# Patient Record
Sex: FEMALE | Race: WHITE | HISPANIC OR LATINO | Employment: UNEMPLOYED | URBAN - METROPOLITAN AREA
[De-identification: names, ages, dates, MRNs, and addresses within clinical notes are randomized per-mention and may not be internally consistent; named-entity substitution may affect disease eponyms.]

---

## 2024-09-12 ENCOUNTER — OFFICE VISIT (OUTPATIENT)
Dept: OBSTETRICS AND GYNECOLOGY | Age: 44
End: 2024-09-12
Payer: MEDICAID

## 2024-09-12 VITALS
SYSTOLIC BLOOD PRESSURE: 128 MMHG | DIASTOLIC BLOOD PRESSURE: 80 MMHG | BODY MASS INDEX: 47.95 KG/M2 | WEIGHT: 254 LBS | HEIGHT: 61 IN

## 2024-09-12 DIAGNOSIS — N92.6 IRREGULAR MENSES: Primary | ICD-10-CM

## 2024-09-12 DIAGNOSIS — Z12.31 SCREENING MAMMOGRAM FOR BREAST CANCER: ICD-10-CM

## 2024-09-12 PROCEDURE — 99203 OFFICE O/P NEW LOW 30 MIN: CPT | Performed by: NURSE PRACTITIONER

## 2024-09-12 RX ORDER — LISINOPRIL AND HYDROCHLOROTHIAZIDE 20; 25 MG/1; MG/1
1 TABLET ORAL DAILY
COMMUNITY
Start: 2024-04-08 | End: 2025-04-08

## 2024-09-12 RX ORDER — NYSTATIN 100000 U/G
CREAM TOPICAL
COMMUNITY
Start: 2024-08-23

## 2024-09-12 RX ORDER — TRIAMCINOLONE ACETONIDE 1 MG/G
CREAM TOPICAL
COMMUNITY
Start: 2024-04-08

## 2024-09-12 RX ORDER — DROSPIRENONE 4 MG/1
1 TABLET, FILM COATED ORAL DAILY
Qty: 84 TABLET | Refills: 0 | Status: SHIPPED | OUTPATIENT
Start: 2024-09-12

## 2024-09-12 NOTE — PROGRESS NOTES
"Subjective   Sarah Chan is a 44 y.o. female is being seen today for   Chief Complaint   Patient presents with    Gynecologic Exam     New pt to establish care c/o irregular menses and desires to get on something to regulate them. Last pap 6/2024 (-) in [place].  Pt has not had MG done yet    .    History of Present Illness    New patient to our office  Previously lived in [place]  Had pap in June there and reports it was normal  Periods have always been irregular- has been on OCPs prior for cycle regulation but stopped taking it years ago  Oftentimes she will skip cycles and then on the months that she has periods they are heavy  Never has more than one in a month  Working on weight loss, she used to be close to 400pounds  She is losing weight with diet and exercise  Wants something to help regulate her cycles  Has PCP and he checked thyroid levels and they were normal  Has DM and hypertension that is controlled on medication      The following portions of the patient's history were reviewed and updated as appropriate: allergies, current medications, past family history, past medical history, past social history, past surgical history and problem list.    /80   Ht 154.9 cm (61\")   Wt 115 kg (254 lb)   LMP 09/10/2024 (Exact Date)   BMI 47.99 kg/m²         Review of Systems   Constitutional: Negative.    HENT: Negative.     Eyes: Negative.    Respiratory: Negative.     Cardiovascular: Negative.    Gastrointestinal: Negative.    Endocrine: Negative.    Genitourinary:  Positive for menstrual problem.   Musculoskeletal: Negative.    Skin: Negative.    Allergic/Immunologic: Negative.    Neurological: Negative.    Hematological: Negative.    Psychiatric/Behavioral: Negative.         Objective   Physical Exam  Constitutional:       Appearance: She is well-developed.   Cardiovascular:      Rate and Rhythm: Normal rate and regular rhythm.   Pulmonary:      Effort: Pulmonary effort is normal.   Abdominal:      " General: Bowel sounds are normal. There is no distension.      Palpations: Abdomen is soft.      Tenderness: There is no abdominal tenderness.   Skin:     General: Skin is warm and dry.   Neurological:      Mental Status: She is alert and oriented to person, place, and time.   Psychiatric:         Behavior: Behavior normal.           Assessment & Plan   Diagnoses and all orders for this visit:    1. Irregular menses (Primary)    2. Screening mammogram for breast cancer  -     Mammo Screening Digital Tomosynthesis Bilateral With CAD    Other orders  -     Slynd 4 MG tablet; Take 1 tablet by mouth Daily.  Dispense: 84 tablet; Refill: 0      Discussed cycles and treatment options.  Patient would like to start with pills  SLynd samples given and rx sent- plan to try POP given her history of hypertension.  If these don't regulate menses we can discuss other options at follow up  If she starts having abnormal bleeding like periods >7 days or multiple a months we would need to do a pelvic ultrasound as well  Patient has never had a mammogram    Plan follow up with me in 3 months, mammogram as well           Total time spent today with Sarah  was 30 minutes (level 3).  Greater than 50% of the time was spent coordinating care, answering her questions and counseling regarding pathophysiology of her presenting problem along with plans for any diagnositc work-up and treatment.

## 2024-10-07 ENCOUNTER — TELEPHONE (OUTPATIENT)
Dept: OBSTETRICS AND GYNECOLOGY | Age: 44
End: 2024-10-07

## 2024-10-07 NOTE — TELEPHONE ENCOUNTER
Caller: Sarah Chan    Relationship to patient: Self    Best call back number: [phone] (home)       Patient is needing: PT CALLED WANTING TO SPEAK WITH WALTER LECHUGA ABOUT HER IRREGULAR PERIODS. PT STATES SHE WOULD PREFER TO ASK PROVIDER DIRECTLY.

## 2024-10-07 NOTE — TELEPHONE ENCOUNTER
Discussed with patient, she is still having some break through bleeding.  She has not completed the first pack of pills yet.  Discussed that it can take up to 3 months to regulate.  If she continues to bleed after completion the first pack we discussed her returning to the office with ultrasound to rule out any underlying causes.

## 2024-11-07 ENCOUNTER — HOSPITAL ENCOUNTER (OUTPATIENT)
Facility: HOSPITAL | Age: 44
Setting detail: OBSERVATION
Discharge: HOME | End: 2024-11-07
Attending: EMERGENCY MEDICINE | Admitting: EMERGENCY MEDICINE
Payer: MEDICAID

## 2024-11-07 VITALS
DIASTOLIC BLOOD PRESSURE: 64 MMHG | WEIGHT: 253.53 LBS | OXYGEN SATURATION: 100 % | HEART RATE: 77 BPM | TEMPERATURE: 99.5 F | SYSTOLIC BLOOD PRESSURE: 115 MMHG | RESPIRATION RATE: 18 BRPM | BODY MASS INDEX: 47.87 KG/M2 | HEIGHT: 61 IN

## 2024-11-07 DIAGNOSIS — D62 ABLA (ACUTE BLOOD LOSS ANEMIA): Primary | ICD-10-CM

## 2024-11-07 DIAGNOSIS — E87.6 HYPOKALEMIA: ICD-10-CM

## 2024-11-07 DIAGNOSIS — N92.6 IRREGULAR MENSES: ICD-10-CM

## 2024-11-07 PROBLEM — D64.9 ANEMIA: Status: ACTIVE | Noted: 2024-11-07

## 2024-11-07 LAB
ABO GROUP BLD: NORMAL
ALBUMIN SERPL-MCNC: 3.8 G/DL (ref 3.5–5.2)
ALBUMIN/GLOB SERPL: 1.1 G/DL
ALP SERPL-CCNC: 45 U/L (ref 39–117)
ALT SERPL W P-5'-P-CCNC: 15 U/L (ref 1–33)
ANION GAP SERPL CALCULATED.3IONS-SCNC: 8.7 MMOL/L (ref 5–15)
APTT PPP: 23.9 SECONDS (ref 22.7–35.4)
AST SERPL-CCNC: 21 U/L (ref 1–32)
BASOPHILS # BLD MANUAL: 0 10*3/MM3 (ref 0–0.2)
BASOPHILS NFR BLD MANUAL: 0 % (ref 0–1.5)
BILIRUB SERPL-MCNC: 0.3 MG/DL (ref 0–1.2)
BLD GP AB SCN SERPL QL: NEGATIVE
BUN SERPL-MCNC: 8 MG/DL (ref 6–20)
BUN/CREAT SERPL: 11.1 (ref 7–25)
CALCIUM SPEC-SCNC: 8.6 MG/DL (ref 8.6–10.5)
CHLORIDE SERPL-SCNC: 103 MMOL/L (ref 98–107)
CO2 SERPL-SCNC: 25.3 MMOL/L (ref 22–29)
CREAT SERPL-MCNC: 0.72 MG/DL (ref 0.57–1)
DEPRECATED RDW RBC AUTO: 36.2 FL (ref 37–54)
EGFRCR SERPLBLD CKD-EPI 2021: 105.2 ML/MIN/1.73
EOSINOPHIL # BLD MANUAL: 0.06 10*3/MM3 (ref 0–0.4)
EOSINOPHIL NFR BLD MANUAL: 1 % (ref 0.3–6.2)
ERYTHROCYTE [DISTWIDTH] IN BLOOD BY AUTOMATED COUNT: 15.9 % (ref 12.3–15.4)
GLOBULIN UR ELPH-MCNC: 3.5 GM/DL
GLUCOSE BLDC GLUCOMTR-MCNC: 118 MG/DL (ref 70–130)
GLUCOSE SERPL-MCNC: 140 MG/DL (ref 65–99)
HCT VFR BLD AUTO: 25.4 % (ref 34–46.6)
HCT VFR BLD AUTO: 28.1 % (ref 34–46.6)
HGB BLD-MCNC: 7 G/DL (ref 12–15.9)
HGB BLD-MCNC: 7.9 G/DL (ref 12–15.9)
HYPOCHROMIA BLD QL: ABNORMAL
INR PPP: 0.98 (ref 0.9–1.1)
IRON 24H UR-MRATE: 12 MCG/DL (ref 37–145)
IRON SATN MFR SERPL: 2 % (ref 20–50)
LYMPHOCYTES # BLD MANUAL: 1.14 10*3/MM3 (ref 0.7–3.1)
LYMPHOCYTES NFR BLD MANUAL: 6.1 % (ref 5–12)
MAGNESIUM SERPL-MCNC: 2.3 MG/DL (ref 1.6–2.6)
MCH RBC QN AUTO: 17.5 PG (ref 26.6–33)
MCHC RBC AUTO-ENTMCNC: 27.6 G/DL (ref 31.5–35.7)
MCV RBC AUTO: 63.3 FL (ref 79–97)
MONOCYTES # BLD: 0.38 10*3/MM3 (ref 0.1–0.9)
NEUTROPHILS # BLD AUTO: 4.62 10*3/MM3 (ref 1.7–7)
NEUTROPHILS NFR BLD MANUAL: 74.5 % (ref 42.7–76)
NRBC BLD AUTO-RTO: 0.3 /100 WBC (ref 0–0.2)
NRBC SPEC MANUAL: 3.1 /100 WBC (ref 0–0.2)
OVALOCYTES BLD QL SMEAR: ABNORMAL
PLAT MORPH BLD: NORMAL
PLATELET # BLD AUTO: 485 10*3/MM3 (ref 140–450)
PMV BLD AUTO: 8.8 FL (ref 6–12)
POTASSIUM SERPL-SCNC: 3.2 MMOL/L (ref 3.5–5.2)
PROT SERPL-MCNC: 7.3 G/DL (ref 6–8.5)
PROTHROMBIN TIME: 13.2 SECONDS (ref 11.7–14.2)
RBC # BLD AUTO: 4.01 10*6/MM3 (ref 3.77–5.28)
RH BLD: POSITIVE
SODIUM SERPL-SCNC: 137 MMOL/L (ref 136–145)
T&S EXPIRATION DATE: NORMAL
TARGETS BLD QL SMEAR: ABNORMAL
TIBC SERPL-MCNC: 513 MCG/DL (ref 298–536)
TRANSFERRIN SERPL-MCNC: 344 MG/DL (ref 200–360)
TSH SERPL DL<=0.05 MIU/L-ACNC: 1.45 UIU/ML (ref 0.27–4.2)
VARIANT LYMPHS NFR BLD MANUAL: 18.4 % (ref 19.6–45.3)
WBC MORPH BLD: NORMAL
WBC NRBC COR # BLD AUTO: 6.2 10*3/MM3 (ref 3.4–10.8)

## 2024-11-07 PROCEDURE — 85018 HEMOGLOBIN: CPT | Performed by: EMERGENCY MEDICINE

## 2024-11-07 PROCEDURE — 85730 THROMBOPLASTIN TIME PARTIAL: CPT | Performed by: EMERGENCY MEDICINE

## 2024-11-07 PROCEDURE — G0378 HOSPITAL OBSERVATION PER HR: HCPCS

## 2024-11-07 PROCEDURE — 99291 CRITICAL CARE FIRST HOUR: CPT

## 2024-11-07 PROCEDURE — 86923 COMPATIBILITY TEST ELECTRIC: CPT

## 2024-11-07 PROCEDURE — 84466 ASSAY OF TRANSFERRIN: CPT | Performed by: NURSE PRACTITIONER

## 2024-11-07 PROCEDURE — 86900 BLOOD TYPING SEROLOGIC ABO: CPT

## 2024-11-07 PROCEDURE — P9016 RBC LEUKOCYTES REDUCED: HCPCS

## 2024-11-07 PROCEDURE — 85014 HEMATOCRIT: CPT | Performed by: EMERGENCY MEDICINE

## 2024-11-07 PROCEDURE — 85025 COMPLETE CBC W/AUTO DIFF WBC: CPT | Performed by: EMERGENCY MEDICINE

## 2024-11-07 PROCEDURE — 82948 REAGENT STRIP/BLOOD GLUCOSE: CPT

## 2024-11-07 PROCEDURE — 83540 ASSAY OF IRON: CPT | Performed by: NURSE PRACTITIONER

## 2024-11-07 PROCEDURE — 84443 ASSAY THYROID STIM HORMONE: CPT | Performed by: NURSE PRACTITIONER

## 2024-11-07 PROCEDURE — 36430 TRANSFUSION BLD/BLD COMPNT: CPT

## 2024-11-07 PROCEDURE — 80053 COMPREHEN METABOLIC PANEL: CPT | Performed by: EMERGENCY MEDICINE

## 2024-11-07 PROCEDURE — 86901 BLOOD TYPING SEROLOGIC RH(D): CPT | Performed by: EMERGENCY MEDICINE

## 2024-11-07 PROCEDURE — 85007 BL SMEAR W/DIFF WBC COUNT: CPT | Performed by: EMERGENCY MEDICINE

## 2024-11-07 PROCEDURE — 86850 RBC ANTIBODY SCREEN: CPT | Performed by: EMERGENCY MEDICINE

## 2024-11-07 PROCEDURE — 85610 PROTHROMBIN TIME: CPT | Performed by: EMERGENCY MEDICINE

## 2024-11-07 PROCEDURE — 83735 ASSAY OF MAGNESIUM: CPT | Performed by: NURSE PRACTITIONER

## 2024-11-07 PROCEDURE — 86901 BLOOD TYPING SEROLOGIC RH(D): CPT

## 2024-11-07 PROCEDURE — 86900 BLOOD TYPING SEROLOGIC ABO: CPT | Performed by: EMERGENCY MEDICINE

## 2024-11-07 RX ORDER — SODIUM CHLORIDE 0.9 % (FLUSH) 0.9 %
10 SYRINGE (ML) INJECTION EVERY 12 HOURS SCHEDULED
Status: DISCONTINUED | OUTPATIENT
Start: 2024-11-07 | End: 2024-11-08 | Stop reason: HOSPADM

## 2024-11-07 RX ORDER — BISACODYL 10 MG
10 SUPPOSITORY, RECTAL RECTAL DAILY PRN
Status: DISCONTINUED | OUTPATIENT
Start: 2024-11-07 | End: 2024-11-08 | Stop reason: HOSPADM

## 2024-11-07 RX ORDER — POLYETHYLENE GLYCOL 3350 17 G/17G
17 POWDER, FOR SOLUTION ORAL DAILY PRN
Status: DISCONTINUED | OUTPATIENT
Start: 2024-11-07 | End: 2024-11-08 | Stop reason: HOSPADM

## 2024-11-07 RX ORDER — AMOXICILLIN 250 MG
2 CAPSULE ORAL 2 TIMES DAILY PRN
Status: DISCONTINUED | OUTPATIENT
Start: 2024-11-07 | End: 2024-11-08 | Stop reason: HOSPADM

## 2024-11-07 RX ORDER — FERROUS SULFATE 325(65) MG
325 TABLET ORAL
Qty: 30 TABLET | Refills: 0 | Status: SHIPPED | OUTPATIENT
Start: 2024-11-07

## 2024-11-07 RX ORDER — POTASSIUM CHLORIDE 750 MG/1
40 TABLET, FILM COATED, EXTENDED RELEASE ORAL ONCE
Status: COMPLETED | OUTPATIENT
Start: 2024-11-07 | End: 2024-11-07

## 2024-11-07 RX ORDER — LISINOPRIL 20 MG/1
20 TABLET ORAL
Status: DISCONTINUED | OUTPATIENT
Start: 2024-11-08 | End: 2024-11-08 | Stop reason: HOSPADM

## 2024-11-07 RX ORDER — FERROUS SULFATE 325(65) MG
325 TABLET ORAL ONCE
Status: COMPLETED | OUTPATIENT
Start: 2024-11-07 | End: 2024-11-07

## 2024-11-07 RX ORDER — ACETAMINOPHEN 325 MG/1
650 TABLET ORAL EVERY 6 HOURS PRN
Status: DISCONTINUED | OUTPATIENT
Start: 2024-11-07 | End: 2024-11-08 | Stop reason: HOSPADM

## 2024-11-07 RX ORDER — SODIUM CHLORIDE 0.9 % (FLUSH) 0.9 %
10 SYRINGE (ML) INJECTION AS NEEDED
Status: DISCONTINUED | OUTPATIENT
Start: 2024-11-07 | End: 2024-11-08 | Stop reason: HOSPADM

## 2024-11-07 RX ORDER — NICOTINE POLACRILEX 4 MG
15 LOZENGE BUCCAL
Status: DISCONTINUED | OUTPATIENT
Start: 2024-11-07 | End: 2024-11-08 | Stop reason: HOSPADM

## 2024-11-07 RX ORDER — BISACODYL 5 MG/1
5 TABLET, DELAYED RELEASE ORAL DAILY PRN
Status: DISCONTINUED | OUTPATIENT
Start: 2024-11-07 | End: 2024-11-08 | Stop reason: HOSPADM

## 2024-11-07 RX ORDER — HYDROCHLOROTHIAZIDE 25 MG/1
25 TABLET ORAL
Status: DISCONTINUED | OUTPATIENT
Start: 2024-11-08 | End: 2024-11-08 | Stop reason: HOSPADM

## 2024-11-07 RX ORDER — DEXTROSE MONOHYDRATE 25 G/50ML
25 INJECTION, SOLUTION INTRAVENOUS
Status: DISCONTINUED | OUTPATIENT
Start: 2024-11-07 | End: 2024-11-08 | Stop reason: HOSPADM

## 2024-11-07 RX ORDER — SODIUM CHLORIDE 9 MG/ML
40 INJECTION, SOLUTION INTRAVENOUS AS NEEDED
Status: DISCONTINUED | OUTPATIENT
Start: 2024-11-07 | End: 2024-11-08 | Stop reason: HOSPADM

## 2024-11-07 RX ORDER — IBUPROFEN 600 MG/1
1 TABLET ORAL
Status: DISCONTINUED | OUTPATIENT
Start: 2024-11-07 | End: 2024-11-08 | Stop reason: HOSPADM

## 2024-11-07 RX ORDER — INSULIN LISPRO 100 [IU]/ML
2-7 INJECTION, SOLUTION INTRAVENOUS; SUBCUTANEOUS
Status: DISCONTINUED | OUTPATIENT
Start: 2024-11-07 | End: 2024-11-08 | Stop reason: HOSPADM

## 2024-11-07 RX ADMIN — Medication 10 ML: at 20:14

## 2024-11-07 RX ADMIN — FERROUS SULFATE TAB 325 MG (65 MG ELEMENTAL FE) 325 MG: 325 (65 FE) TAB at 21:15

## 2024-11-07 RX ADMIN — POTASSIUM CHLORIDE 40 MEQ: 750 TABLET, EXTENDED RELEASE ORAL at 14:08

## 2024-11-07 NOTE — PROGRESS NOTES
[facility] ATTESTATION NOTE    The MICHAEL and I have discussed this patient's history, physical exam, and treatment plan.  I have reviewed the documentation and personally had a face to face interaction with the patient. I affirm the documentation and agree with the treatment and plan.  The attached note describes my personal findings.      I provided a substantive portion of the care of this patient. I personally performed the physical exam, in its entirety.    Sarah Chan is a 44 y.o. female who presented to the emergency department today complaining of anemia.  She required admission to the observation unit for further evaluation.  She has been having heavy menses.  Plan transfusion.  She denies any chest pain or shortness of breath.  She denies orthostasis.  We will check TSH and iron panel.  We offered gynecology consult here but the patient declines.  She wants to follow-up as an outpatient.  She wants to receive transfusion and be discharged home.  She declines further workup here.      On exam:  GENERAL: Awake, alert, no acute distress  SKIN: Warm, dry  HENT: Normocephalic, atraumatic  EYES: no scleral icterus  CV: regular rhythm, regular rate  RESPIRATORY: normal effort, lungs clear  ABDOMEN: soft, nontender, nondistended  MUSCULOSKELETAL: no deformity  NEURO: alert, moves all extremities, follows commands          Note Disclaimer: At [facility], we believe that sharing information builds trust and better relationships. You are receiving this note because you recently visited [facility]. It is possible you will see health information before a provider has talked with you about it. This kind of information can be easy to misunderstand. To help you fully understand what it means for your health, we urge you to discuss this note with your provider.

## 2024-11-07 NOTE — ED PROVIDER NOTES
EMERGENCY DEPARTMENT ENCOUNTER    Room Number:  27/27  PCP: Guille Stringer NP    HPI:  Chief Complaint: Low hemoglobin  A complete HPI/ROS/PMH/PSH/SH/FH are unobtainable due to: None  Context: Sarah Chan is a 44 y.o. female who presents to the ED c/o acute low hemoglobin.  She states that she is been having recent gastroenteritis.  She saw her PCP yesterday and was found to have low hemoglobin was told to come to the ER for evaluation.  She reports having history of heavy menses.  She has been on birth control medicine that she started taking within the last approximately 6 weeks.        PAST MEDICAL HISTORY  Active Ambulatory Problems     Diagnosis Date Noted    Screening mammogram for breast cancer 09/12/2024    Irregular menses 09/12/2024     Resolved Ambulatory Problems     Diagnosis Date Noted    No Resolved Ambulatory Problems     Past Medical History:   Diagnosis Date    Diabetes     Hypertension          PAST SURGICAL HISTORY  Past Surgical History:   Procedure Laterality Date    TONSILLECTOMY           FAMILY HISTORY  Family History   Problem Relation Age of Onset    Stroke Father          SOCIAL HISTORY  Social History     Socioeconomic History    Marital status:    Tobacco Use    Smoking status: Never    Smokeless tobacco: Never   Vaping Use    Vaping status: Never Used   Substance and Sexual Activity    Alcohol use: Yes     Comment: social    Drug use: Never    Sexual activity: Yes     Partners: Male         ALLERGIES  Patient has no known allergies.        REVIEW OF SYSTEMS  Review of Systems     Included in HPI  All systems reviewed and negative except for those discussed in HPI.       PHYSICAL EXAM  ED Triage Vitals   Temp Heart Rate Resp BP SpO2   11/07/24 1239 11/07/24 1239 11/07/24 1309 11/07/24 1245 11/07/24 1239   98.8 °F (37.1 °C) (!) 136 16 131/85 98 %      Temp src Heart Rate Source Patient Position BP Location FiO2 (%)   -- -- -- -- --              Physical  Exam      GENERAL: no acute distress  HENT: nares patent  EYES: no scleral icterus  CV: regular rhythm, normal rate  RESPIRATORY: normal effort, clear to auscultation bilaterally  ABDOMEN: soft, nontender  MUSCULOSKELETAL: no deformity  NEURO: alert, moves all extremities, follows commands  PSYCH:  calm, cooperative  SKIN: warm, dry    Vital signs and nursing notes reviewed.          LAB RESULTS  Recent Results (from the past 24 hours)   Comprehensive Metabolic Panel    Collection Time: 11/07/24  1:08 PM    Specimen: Blood   Result Value Ref Range    Glucose 140 (H) 65 - 99 mg/dL    BUN 8 6 - 20 mg/dL    Creatinine 0.72 0.57 - 1.00 mg/dL    Sodium 137 136 - 145 mmol/L    Potassium 3.2 (L) 3.5 - 5.2 mmol/L    Chloride 103 98 - 107 mmol/L    CO2 25.3 22.0 - 29.0 mmol/L    Calcium 8.6 8.6 - 10.5 mg/dL    Total Protein 7.3 6.0 - 8.5 g/dL    Albumin 3.8 3.5 - 5.2 g/dL    ALT (SGPT) 15 1 - 33 U/L    AST (SGOT) 21 1 - 32 U/L    Alkaline Phosphatase 45 39 - 117 U/L    Total Bilirubin 0.3 0.0 - 1.2 mg/dL    Globulin 3.5 gm/dL    A/G Ratio 1.1 g/dL    BUN/Creatinine Ratio 11.1 7.0 - 25.0    Anion Gap 8.7 5.0 - 15.0 mmol/L    eGFR 105.2 >60.0 mL/min/1.73   Protime-INR    Collection Time: 11/07/24  1:08 PM    Specimen: Blood   Result Value Ref Range    Protime 13.2 11.7 - 14.2 Seconds    INR 0.98 0.90 - 1.10   aPTT    Collection Time: 11/07/24  1:08 PM    Specimen: Blood   Result Value Ref Range    PTT 23.9 22.7 - 35.4 seconds   CBC Auto Differential    Collection Time: 11/07/24  1:08 PM    Specimen: Blood   Result Value Ref Range    WBC 6.20 3.40 - 10.80 10*3/mm3    RBC 4.01 3.77 - 5.28 10*6/mm3    Hemoglobin 7.0 (L) 12.0 - 15.9 g/dL    Hematocrit 25.4 (L) 34.0 - 46.6 %    MCV 63.3 (L) 79.0 - 97.0 fL    MCH 17.5 (L) 26.6 - 33.0 pg    MCHC 27.6 (L) 31.5 - 35.7 g/dL    RDW 15.9 (H) 12.3 - 15.4 %    RDW-SD 36.2 (L) 37.0 - 54.0 fl    MPV 8.8 6.0 - 12.0 fL    Platelets 485 (H) 140 - 450 10*3/mm3    nRBC 0.3 (H) 0.0 - 0.2 /100 WBC        Ordered the above labs and reviewed the results.        RADIOLOGY  No Radiology Exams Resulted Within Past 24 Hours    Ordered the above noted radiological studies. Reviewed by me in PACS.        MEDICATIONS GIVEN IN ER  Medications   Potassium Replacement - Follow Nurse / BPA Driven Protocol (has no administration in time range)   potassium chloride (K-DUR,KLOR-CON) ER tablet 40 mEq (has no administration in time range)         ORDERS PLACED DURING THIS VISIT:  Orders Placed This Encounter   Procedures    Critical Care    Comprehensive Metabolic Panel    Protime-INR    aPTT    CBC Auto Differential    Manual Differential    Verify Informed Consent    Type & Screen    Prepare RBC, 1 Units    CBC & Differential         OUTPATIENT MEDICATION MANAGEMENT:  Current Facility-Administered Medications Ordered in Epic   Medication Dose Route Frequency Provider Last Rate Last Admin    potassium chloride (K-DUR,KLOR-CON) ER tablet 40 mEq  40 mEq Oral Once Albert Oneal II, MD        Potassium Replacement - Follow Nurse / BPA Driven Protocol   Does not apply PRN Albert Oneal II, MD         Current Outpatient Medications Ordered in Epic   Medication Sig Dispense Refill    metFORMIN (GLUCOPHAGE) 1000 MG tablet Take 1 tablet by mouth.      lisinopril-hydrochlorothiazide (PRINZIDE,ZESTORETIC) 20-25 MG per tablet Take 1 tablet by mouth Daily.      nystatin (MYCOSTATIN) 916007 UNIT/GM cream       sertraline (ZOLOFT) 50 MG tablet Take 1 tablet by mouth Daily.      Slynd 4 MG tablet Take 1 tablet by mouth Daily. 84 tablet 0    triamcinolone (KENALOG) 0.1 % cream Apply  topically to the appropriate area as directed.         PROCEDURES  Critical Care    Performed by: Albert Oneal II, MD  Authorized by: Albert Oneal II, MD    Critical care provider statement:     Critical care time (minutes):  30    Critical care was necessary to treat or prevent imminent or life-threatening deterioration of the  following conditions: acute severe anemia.    Critical care was time spent personally by me on the following activities:  Ordering and performing treatments and interventions, ordering and review of laboratory studies, pulse oximetry, re-evaluation of patient's condition, evaluation of patient's response to treatment, examination of patient and obtaining history from patient or surrogate            MEDICAL DECISION MAKING, PROGRESS, and CONSULTS    Discussion below represents my analysis of pertinent findings related to patient's condition, differential diagnosis, treatment plan and final disposition.        Differential diagnosis:    GI bleed, vaginal bleeding             Independent interpretation of labs, radiology studies, and discussions with consultants:  ED Course as of 11/07/24 1401   Thu Nov 07, 2024   1257 Medical chart review, reviewed outside records from [facility] SARAVANAN Laureano from yesterday.  Patient has been lost to follow-up as she splits her time between [place] and [place].  Patient with nausea and vomiting to be due to viral gastroenteritis.  Patient with depression.  Sertraline increased to 100 mg daily. [TD]   1357 Hemoglobin(!): 7.0 [TD]   1357 Potassium(!): 3.2 [TD]   1400 Cussed the case with RAMU Bravo.  She will admit to the observation unit. [TD]      ED Course User Index  [TD] Albert Oneal II, MD               DIAGNOSIS  Final diagnoses:   ABLA (acute blood loss anemia)   Hypokalemia         DISPOSITION  Admit      Latest Documented Vital Signs:  As of 14:01 EST  BP- 142/76 HR- 97 Temp- 98.8 °F (37.1 °C) O2 sat- 100%      --    Please note that portions of this were completed with a voice recognition program.       Note Disclaimer: At [facility], we believe that sharing information builds trust and better relationships. You are receiving this note because you are receiving care at [facility] or recently visited. It is possible you will see [facility]  information before a provider has talked with you about it. This kind of information can be easy to misunderstand. To help you fully understand what it means for your health, we urge you to discuss this note with your provider.         Albert Oneal II, MD  11/07/24 8851

## 2024-11-07 NOTE — H&P
[facility]   HISTORY AND PHYSICAL    Patient Name: Sarah Chan  : 1980  MRN: [id number]  Primary Care Physician:  Guille Stringer NP  Date of admission: 2024    Subjective   Subjective     Chief Complaint:   Chief Complaint   Patient presents with    Abnormal Lab     HPI:    Sarah Chan is a 44 y.o. female presented to the emergency department due to abnormal lab value.  Medical history significant for but not limited to bipolar 1 disorder, diabetes, hypertension, irregular periods.  She was recently started on oral birth control due to irregular and heavy menstrual cycles.  She reports cycles are still irregular, but lighter.  LMP started 2 days ago.  She was told to come to the emergency department for possible blood transfusion or iron infusion due to anemia.  2 days ago she had significant nausea, vomiting and diarrhea.  Symptoms have since resolved.  She denies abdominal pain.  No chest pain or dyspnea.  Endorses mild fatigue.    CMP significant for low potassium 3.2, glucose elevated at 140.  Globin was 7 with a hematocrit of 25.4.  Platelets elevated at 48.5.  MCV, MCH, MCHC all low.    She will be admitted to the ED observation unit.  Will transfuse 1 unit of packed red blood cells.     Review of Systems   All systems were reviewed and negative except for: Those mentioned in HPI    Personal History     Past Medical History:   Diagnosis Date    Bipolar 1 disorder     Diabetes     Hypertension     Irregular menses        Past Surgical History:   Procedure Laterality Date    TONSILLECTOMY         Family History: family history includes Stroke in her father. Otherwise pertinent FHx was reviewed and not pertinent to current issue.    Social History:  reports that she has never smoked. She has never used smokeless tobacco. She reports current alcohol use. She reports that she does not use drugs.    Home Medications:  Drospirenone, lisinopril-hydrochlorothiazide, metFORMIN,  nystatin, sertraline, and triamcinolone    Allergies:  No Known Allergies    Objective   Objective     Vitals:   Temp:  [98.6 °F (37 °C)-98.8 °F (37.1 °C)] 98.6 °F (37 °C)  Heart Rate:  [] 82  Resp:  [16] 16  BP: (122-142)/(65-85) 122/65  Physical Exam    Constitutional: Awake, alert   Eyes: PERRLA, sclerae anicteric, no conjunctival injection   HENT: NCAT, mucous membranes moist   Neck: Supple, no thyromegaly, no lymphadenopathy, trachea midline   Respiratory: Clear to auscultation bilaterally, nonlabored respirations    Cardiovascular: RRR, no murmurs, rubs, or gallops, palpable pedal pulses bilaterally   Gastrointestinal: Positive bowel sounds, soft, nontender, nondistended   Musculoskeletal: No bilateral ankle edema, no clubbing or cyanosis to extremities   Psychiatric: Appropriate affect, cooperative   Neurologic: Oriented x 3, strength symmetric in all extremities, Cranial Nerves grossly intact to confrontation, speech clear   Skin: No rashes     Result Review    Result Review:  I have personally reviewed the results from the time of this admission to 11/7/2024 16:00 EST and agree with these findings:  [x]  Laboratory list / accordion  []  Microbiology  []  Radiology  []  EKG/Telemetry   []  Cardiology/Vascular   []  Pathology  []  Old records  []  Other:  Most notable findings include: Potassium low 3.2, glucose elevated 140, hemoglobin and hematocrit low 7.0, 25.4      Assessment & Plan   Assessment / Plan     Brief Patient Summary:  Sarah Chan is a 44 y.o. female who be made to the ED observation unit due to microcytic anemia.  Will transfuse 1 unit packed red blood cells.    Active Hospital Problems:  Active Hospital Problems    Diagnosis     **Anemia      Plan:     Anemia  -Transfuse 1 unit packed red blood cells  -Check iron panel  -Check TSH  -Will need follow-up with her OB/GYN outpatient    Diabetes  -Hold metformin  -Correction scale ordered    Bipolar  -Continue sertraline    VTE  Prophylaxis:  Mechanical VTE prophylaxis orders are present.    CODE STATUS:       Admission Status:  I believe this patient meets observation status.    Electronically signed by SARAVANAN Nielsen, 11/07/24, 4:00 PM EST.    75 minutes has been spent by [facility] providers in the care of this patient while under observation status    I have worn appropriate PPE during this patient encounter, sanitized my hands both with entering and exiting patient's room.

## 2024-11-07 NOTE — ED NOTES
Patient arrives via pv from home for complaints of abnormal blood work. Patient had blood drawn and was told her Hemoglobin was low. Patient has not had any blood in her stool, currently on her period. Alert and oriented x4.

## 2024-11-07 NOTE — ED NOTES
Nursing report ED to floor  Sarah Chan  44 y.o.  female    HPI :  HPI  Stated Reason for Visit: abnormal labs  History Obtained From: patient    Chief Complaint  Chief Complaint   Patient presents with    Abnormal Lab       Admitting doctor:   Luther Lam MD    Admitting diagnosis:   The primary encounter diagnosis was ABLA (acute blood loss anemia). A diagnosis of Hypokalemia was also pertinent to this visit.    Code status:   Current Code Status       Date Active Code Status Order ID Comments User Context       Not on file            Allergies:   Patient has no known allergies.    Isolation:   No active isolations    Intake and Output  No intake or output data in the 24 hours ending 11/07/24 1408    Weight:       11/07/24  1241   Weight: 115 kg (253 lb 8.5 oz)       Most recent vitals:   Vitals:    11/07/24 1247 11/07/24 1303 11/07/24 1309 11/07/24 1316   BP: 137/79   142/76   Pulse:  89  97   Resp:   16    Temp:       SpO2:  100%  100%   Weight:       Height:           Active LDAs/IV Access:   Lines, Drains & Airways       Active LDAs       Name Placement date Placement time Site Days    Peripheral IV 11/07/24 1310 Right Antecubital 11/07/24  1310  Antecubital  less than 1                    Labs (abnormal labs have a star):   Labs Reviewed   COMPREHENSIVE METABOLIC PANEL - Abnormal; Notable for the following components:       Result Value    Glucose 140 (*)     Potassium 3.2 (*)     All other components within normal limits    Narrative:     GFR Normal >60  Chronic Kidney Disease <60  Kidney Failure <15     CBC WITH AUTO DIFFERENTIAL - Abnormal; Notable for the following components:    Hemoglobin 7.0 (*)     Hematocrit 25.4 (*)     MCV 63.3 (*)     MCH 17.5 (*)     MCHC 27.6 (*)     RDW 15.9 (*)     RDW-SD 36.2 (*)     Platelets 485 (*)     nRBC 0.3 (*)     All other components within normal limits   MANUAL DIFFERENTIAL - Abnormal; Notable for the following components:    Lymphocyte % 18.4 (*)      nRBC 3.1 (*)     All other components within normal limits   PROTIME-INR - Normal   APTT - Normal   POCT GLUCOSE FINGERSTICK   POCT GLUCOSE FINGERSTICK   POCT GLUCOSE FINGERSTICK   POCT GLUCOSE FINGERSTICK   TYPE AND SCREEN   PREPARE RBC   CBC AND DIFFERENTIAL    Narrative:     The following orders were created for panel order CBC & Differential.  Procedure                               Abnormality         Status                     ---------                               -----------         ------                     CBC Auto Differential[[id number]]        Abnormal            Final result                 Please view results for these tests on the individual orders.       EKG:   No orders to display       Meds given in ED:   Medications   Potassium Replacement - Follow Nurse / BPA Driven Protocol (has no administration in time range)   potassium chloride (K-DUR,KLOR-CON) ER tablet 40 mEq (has no administration in time range)   sodium chloride 0.9 % flush 10 mL (has no administration in time range)   sodium chloride 0.9 % flush 10 mL (has no administration in time range)   sodium chloride 0.9 % infusion 40 mL (has no administration in time range)   Potassium Replacement - Follow Nurse / BPA Driven Protocol (has no administration in time range)   Magnesium Standard Dose Replacement - Follow Nurse / BPA Driven Protocol (has no administration in time range)   Phosphorus Replacement - Follow Nurse / BPA Driven Protocol (has no administration in time range)   Calcium Replacement - Follow Nurse / BPA Driven Protocol (has no administration in time range)   sennosides-docusate (PERICOLACE) 8.6-50 MG per tablet 2 tablet (has no administration in time range)     And   polyethylene glycol (MIRALAX) packet 17 g (has no administration in time range)     And   bisacodyl (DULCOLAX) EC tablet 5 mg (has no administration in time range)     And   bisacodyl (DULCOLAX) suppository 10 mg (has no administration in time range)   dextrose  (GLUTOSE) oral gel 15 g (has no administration in time range)   dextrose (D50W) (25 g/50 mL) IV injection 25 g (has no administration in time range)   glucagon (GLUCAGEN) injection 1 mg (has no administration in time range)   insulin lispro (HUMALOG/ADMELOG) injection 2-7 Units (has no administration in time range)       Imaging results:  No radiology results for the last day    Ambulatory status:   -  up ad kellee    Social issues:   Social History     Socioeconomic History    Marital status:    Tobacco Use    Smoking status: Never    Smokeless tobacco: Never   Vaping Use    Vaping status: Never Used   Substance and Sexual Activity    Alcohol use: Yes     Comment: social    Drug use: Never    Sexual activity: Yes     Partners: Male       Peripheral Neurovascular  Peripheral Neurovascular (Adult)  Peripheral Neurovascular WDL: WDL    Neuro Cognitive  Neuro Cognitive (Adult)  Cognitive/Neuro/Behavioral WDL: .WDL except, mood/behavior  Mood/Behavior: anxious    Learning  Learning Assessment  Learning Readiness and Ability: no barriers identified    Respiratory  Respiratory  Airway WDL: WDL  Respiratory WDL  Respiratory WDL: WDL    Abdominal Pain       Pain Assessments  Pain (Adult)  (0-10) Pain Rating: Rest: 0    NIH Stroke Scale       Marybeth Colin, RN  11/07/24 14:08 EST

## 2024-11-08 LAB
BH BB BLOOD EXPIRATION DATE: NORMAL
BH BB BLOOD TYPE BARCODE: 6200
BH BB DISPENSE STATUS: NORMAL
BH BB PRODUCT CODE: NORMAL
BH BB UNIT NUMBER: NORMAL
CROSSMATCH INTERPRETATION: NORMAL
UNIT  ABO: NORMAL
UNIT  RH: NORMAL

## 2024-11-08 NOTE — DISCHARGE SUMMARY
ED OBSERVATION PROGRESS/DISCHARGE SUMMARY    Date of Admission: 11/7/2024   LOS: 0 days   PCP: Guille Stringer NP    Final Diagnosis anemia      Subjective     Hospital Outcome:     Sarah Chan is a 44 y.o. female presented to the emergency department due to abnormal lab value.  Medical history significant for but not limited to bipolar 1 disorder, diabetes, hypertension, irregular periods.  She was recently started on oral birth control due to irregular and heavy menstrual cycles.  She reports cycles are still irregular, but lighter.  LMP started 2 days ago.  She was told to come to the emergency department for possible blood transfusion or iron infusion due to anemia.  2 days ago she had significant nausea, vomiting and diarrhea.  Symptoms have since resolved.  She denies abdominal pain.  No chest pain or dyspnea.  Endorses mild fatigue.     CMP significant for low potassium 3.2, glucose elevated at 140.  Globin was 7 with a hematocrit of 25.4.  Platelets elevated at 48.5.  MCV, MCH, MCHC all low.     She will be admitted to the ED observation unit.  Will transfuse 1 unit of packed red blood cells.     ROS:  General: no fevers, chills  Respiratory: no cough, dyspnea  Cardiovascular: no chest pain, palpitations  Abdomen: No abdominal pain, nausea, vomiting, or diarrhea  Neurologic: No focal weakness    Objective   Physical Exam:  I have reviewed the vital signs.  Temp:  [98.6 °F (37 °C)-99.5 °F (37.5 °C)] 99.5 °F (37.5 °C)  Heart Rate:  [] 77  Resp:  [16-18] 18  BP: (109-142)/(64-85) 115/64  General Appearance:    Alert, cooperative, no distress  Head:    Normocephalic, atraumatic  Eyes:    Sclerae anicteric  Neck:   Supple, no mass  Lungs: Clear to auscultation bilaterally, respirations unlabored  Heart: Regular rate and rhythm, S1 and S2 normal, no murmur, rub or gallop  Abdomen:  Soft, nontender, bowel sounds active, nondistended  Extremities: No clubbing, cyanosis, or edema to lower  extremities  Pulses:  2+ and symmetric in distal lower extremities  Skin: No rashes   Neurologic: Oriented x3, Normal strength to extremities    Results Review:    I have reviewed the labs, radiology results and diagnostic studies.    Results from last 7 days   Lab Units 11/07/24 2008 11/07/24  1308   WBC 10*3/mm3  --  6.20   HEMOGLOBIN g/dL 7.9* 7.0*   HEMATOCRIT % 28.1* 25.4*   PLATELETS 10*3/mm3  --  485*     Results from last 7 days   Lab Units 11/07/24  1308   SODIUM mmol/L 137   POTASSIUM mmol/L 3.2*   CHLORIDE mmol/L 103   CO2 mmol/L 25.3   BUN mg/dL 8   CREATININE mg/dL 0.72   CALCIUM mg/dL 8.6   BILIRUBIN mg/dL 0.3   ALK PHOS U/L 45   ALT (SGPT) U/L 15   AST (SGOT) U/L 21   GLUCOSE mg/dL 140*     Imaging Results (Last 24 Hours)       ** No results found for the last 24 hours. **            I have reviewed the medications.     Discharge Medications        New Medications        Instructions Start Date   ferrous sulfate 325 (65 FE) MG tablet   325 mg, Oral, Daily With Breakfast             Changes to Medications        Instructions Start Date   sertraline 50 MG tablet  Commonly known as: ZOLOFT  What changed: when to take this   50 mg, Oral, Daily             Continue These Medications        Instructions Start Date   lisinopril-hydrochlorothiazide 20-25 MG per tablet  Commonly known as: PRINZIDE,ZESTORETIC   1 tablet, Daily      metFORMIN 1000 MG tablet  Commonly known as: GLUCOPHAGE   1,000 mg      nystatin 196511 UNIT/GM cream  Commonly known as: MYCOSTATIN       Slynd 4 MG tablet  Generic drug: Drospirenone   4 mg, Oral, Daily      triamcinolone 0.1 % cream  Commonly known as: KENALOG   Topical              ---------------------------------------------------------------------------------------------  Assessment & Plan   Assessment/Problem List    Anemia      Plan:      Anemia, microcytic, hypochromic  Menorrhagia hx  Iron deficiency  -Transfuse 1 unit packed red blood cells, initial hemoglobin 7.0,  repeat of 7.9 after 1 unit PRBCs given  -Check iron panel low iron and iron saturation.  Will order daily iron 325 mg with meals upon discharge.  -TSH normal  -Will need follow-up with her OB/GYN outpatient, as patient was offered OB/GYN consultation for the morning however patient declined this and would like to go home instead.  Patient is asymptomatic at this time upon discharge and strict return precautions were discussed and patient voiced understanding.  Patient to follow-up with gynecology and PCP for repeat lab work and follow-up on anemia.    Hypokalemia, replaced  -Potassium 3.2, 40 mEq of potassium given     Diabetes  -Hold metformin  -Correction scale ordered     Bipolar  -Continue sertraline     VTE Prophylaxis:  Mechanical VTE prophylaxis orders are present.        Disposition: Home    Follow-up after Discharge: Gynecology and PCP in 1 week's time    This note will serve as a discharge summary    RAMU Peñaloza 11/07/24 21:13 EST    I have worn appropriate PPE during this patient encounter, sanitized my hands both with entering and exiting patient's room.      38 minutes has been spent by [facility] providers in the care of this patient while under observation status

## 2024-11-08 NOTE — PLAN OF CARE
Goal Outcome Evaluation:  Plan of Care Reviewed With: patient           Outcome Evaluation: Pts being discharged. IV removed. After Visit Summary form reviewed with patient.

## 2024-11-08 NOTE — PLAN OF CARE
Goal Outcome Evaluation:   Pts being prepared for discharge. IV removed. After visiy summary presented

## 2024-11-08 NOTE — DISCHARGE INSTRUCTIONS
Please return to the ER if you have significant shortness of breath, feel like you are going to pass out or do pass out as you will need reevaluation.  Please follow-up with her primary care provider and OB/GYN provider in 1 week's time.  Please take daily iron supplement with meals to help with your anemia.

## 2024-12-12 ENCOUNTER — OFFICE VISIT (OUTPATIENT)
Dept: OBSTETRICS AND GYNECOLOGY | Age: 44
End: 2024-12-12
Payer: MEDICAID

## 2024-12-12 VITALS
DIASTOLIC BLOOD PRESSURE: 78 MMHG | WEIGHT: 257.4 LBS | HEIGHT: 61 IN | SYSTOLIC BLOOD PRESSURE: 128 MMHG | BODY MASS INDEX: 48.6 KG/M2

## 2024-12-12 DIAGNOSIS — N93.9 ABNORMAL UTERINE BLEEDING (AUB): Primary | ICD-10-CM

## 2024-12-12 DIAGNOSIS — N92.6 IRREGULAR MENSES: ICD-10-CM

## 2024-12-12 RX ORDER — FOLIC ACID 1 MG/1
1 TABLET ORAL DAILY
COMMUNITY
Start: 2024-11-12 | End: 2025-11-12

## 2024-12-12 RX ORDER — NORETHINDRONE ACETATE AND ETHINYL ESTRADIOL, ETHINYL ESTRADIOL AND FERROUS FUMARATE 1MG-10(24)
1 KIT ORAL DAILY
Qty: 28 TABLET | Refills: 3 | COMMUNITY

## 2024-12-12 RX ORDER — FAMOTIDINE 20 MG/1
20 TABLET, FILM COATED ORAL
COMMUNITY
Start: 2024-11-11 | End: 2025-11-11

## 2024-12-12 RX ORDER — ONDANSETRON 4 MG/1
4 TABLET, ORALLY DISINTEGRATING ORAL
COMMUNITY
Start: 2024-11-05

## 2024-12-12 NOTE — PROGRESS NOTES
"Subjective   Sarah Chan is a 44 y.o. female is being seen today for   Chief Complaint   Patient presents with    Follow-up     Gyn F/u  Cc:3 month f/up and pt miss mg appt reschedule    .    History of Present Illness    Patient here for OCP follow up  Started on Slynd for irregular menses- decided to try POPs first due to patient preference and history of hypertension although it is well controlled on PB meds  She is still having irregular bleeding on Slynd   Since seeing me last she had a very heavy menses that took her to the ER where she ended up needing a transfusion  Since that time her bleeding has greatly decreased but she is still having some breakthrough bleeding  She is on her third pack of pills  She does have follow-up with hematology in January, she is not symptomatic of any anemia at this point and is taking iron supplements  Patient is not having active bleeding right now  She is good about taking the pill and has not missed any    The following portions of the patient's history were reviewed and updated as appropriate: allergies, current medications, past family history, past medical history, past social history, past surgical history and problem list.    /78   Ht 154.9 cm (60.98\")   Wt 117 kg (257 lb 6.4 oz)   LMP 12/10/2024 (Approximate)   BMI 48.66 kg/m²         Review of Systems   Constitutional: Negative.    HENT: Negative.     Eyes: Negative.    Respiratory: Negative.     Cardiovascular: Negative.    Gastrointestinal: Negative.    Endocrine: Negative.    Genitourinary:  Positive for menstrual problem.   Musculoskeletal: Negative.    Skin: Negative.    Allergic/Immunologic: Negative.    Neurological: Negative.    Hematological: Negative.    Psychiatric/Behavioral: Negative.         Objective   Physical Exam  Constitutional:       Appearance: She is well-developed.   Cardiovascular:      Rate and Rhythm: Normal rate and regular rhythm.   Pulmonary:      Effort: Pulmonary effort " is normal.   Abdominal:      General: Bowel sounds are normal. There is no distension.      Palpations: Abdomen is soft.      Tenderness: There is no abdominal tenderness.   Skin:     General: Skin is warm and dry.   Neurological:      Mental Status: She is alert and oriented to person, place, and time.   Psychiatric:         Behavior: Behavior normal.           Assessment & Plan   Diagnoses and all orders for this visit:    1. Abnormal uterine bleeding (AUB) (Primary)    2. Irregular menses      Discussed periods with patient, given her periods have not regulated out I do think a pelvic ultrasound and endometrial biopsy is warranted, we will get that scheduled in the next couple weeks  In the meantime we will change her pill to an estrogen containing pill and see if that better controls her bleeding  She has a few follow-ups coming up and will have her blood pressure checked  We also discussed the option of an IUD, she would like to try the pill first but may be interested in that if it still is not regulated  Bleeding precautions advised           Total time spent today with Sarah  was 30 minutes (level 4).  Greater than 50% of the time was spent coordinating care, answering her questions and counseling regarding pathophysiology of her presenting problem along with plans for any diagnositc work-up and treatment.

## 2024-12-30 ENCOUNTER — TELEPHONE (OUTPATIENT)
Dept: OBSTETRICS AND GYNECOLOGY | Age: 44
End: 2024-12-30

## 2024-12-30 NOTE — TELEPHONE ENCOUNTER
Caller Name: Sarah Chan      Relationship: Self      Best Contact Number: [phone] (home)       Patient is requesting samples of LO LOESTRINSE      How many days of medication do you have left? 0 NONE      Additional Information: WAS GIVEN SAMPLE OF LO LOESTRINSE  WOULD LIKE ANOTHER SAMPLE OR PRESCRIPTION

## 2025-01-02 RX ORDER — NORETHINDRONE ACETATE AND ETHINYL ESTRADIOL, ETHINYL ESTRADIOL AND FERROUS FUMARATE 1MG-10(24)
1 KIT ORAL DAILY
Qty: 28 TABLET | Refills: 3 | Status: SHIPPED | OUTPATIENT
Start: 2025-01-02

## 2025-01-02 RX ORDER — NORETHINDRONE ACETATE AND ETHINYL ESTRADIOL, ETHINYL ESTRADIOL AND FERROUS FUMARATE 1MG-10(24)
1 KIT ORAL DAILY
Qty: 28 TABLET | Refills: 3 | Status: CANCELLED | OUTPATIENT
Start: 2025-01-02

## 2025-01-07 ENCOUNTER — TELEPHONE (OUTPATIENT)
Age: 45
End: 2025-01-07

## 2025-01-07 NOTE — TELEPHONE ENCOUNTER
Patient returned the call and rescheduled her visit with Hnia.   responds to verbal commands/cranial nerves intact/normal strength/sensation intact/alert and oriented x 3

## 2025-01-08 ENCOUNTER — TELEPHONE (OUTPATIENT)
Dept: OBSTETRICS AND GYNECOLOGY | Age: 45
End: 2025-01-08

## 2025-01-08 NOTE — TELEPHONE ENCOUNTER
Hub staff attempted to follow warm transfer process and was unsuccessful     Caller: Sarah Chan    Relationship to patient: Self    Best call back number: [phone]      Patient is needing: PT NEEDS TO RESCHEDULE HER GYN FOLLOW UP AND U/S APPTS FROM TODAY DUE TO THE WEATHER.

## 2025-01-14 ENCOUNTER — TELEPHONE (OUTPATIENT)
Dept: OBSTETRICS AND GYNECOLOGY | Age: 45
End: 2025-01-14
Payer: MEDICAID

## 2025-01-23 ENCOUNTER — OFFICE VISIT (OUTPATIENT)
Dept: OBSTETRICS AND GYNECOLOGY | Age: 45
End: 2025-01-23
Payer: MEDICAID

## 2025-01-23 VITALS
HEIGHT: 61 IN | SYSTOLIC BLOOD PRESSURE: 136 MMHG | WEIGHT: 266 LBS | DIASTOLIC BLOOD PRESSURE: 84 MMHG | BODY MASS INDEX: 50.22 KG/M2

## 2025-01-23 DIAGNOSIS — Z13.9 SPECIAL SCREENING: Primary | ICD-10-CM

## 2025-01-23 DIAGNOSIS — N93.9 ABNORMAL UTERINE BLEEDING (AUB): ICD-10-CM

## 2025-01-23 LAB
B-HCG UR QL: NEGATIVE
EXPIRATION DATE: NORMAL
INTERNAL NEGATIVE CONTROL: NORMAL
INTERNAL POSITIVE CONTROL: NORMAL
Lab: NORMAL

## 2025-01-23 RX ORDER — NORETHINDRONE ACETATE AND ETHINYL ESTRADIOL, ETHINYL ESTRADIOL AND FERROUS FUMARATE 1MG-10(24)
1 KIT ORAL DAILY
Qty: 28 TABLET | Refills: 3 | Status: SHIPPED | OUTPATIENT
Start: 2025-01-23

## 2025-01-23 NOTE — PROGRESS NOTES
"Marivel Chan is a 44 y.o. female is being seen today for   Chief Complaint   Patient presents with    Gynecologic Exam     Abnormal bleeding, EMBX   .    History of Present Illness    Patient here for follow-up for abnormal uterine bleeding, ultrasound, endometrial biopsy  She is taking  LoLoestrin and states her bleeding has decreased quite a bit but she is still continuing to bleed most days  It is lots better than it was on the progesterone only pills  BPs have been stable  Recent labs did show that her hemoglobin is stable  States most days she is just having to wear a pad  She is very, very nervous today      The following portions of the patient's history were reviewed and updated as appropriate: allergies, current medications, past family history, past medical history, past social history, past surgical history and problem list.    /84   Ht 154.9 cm (60.98\")   Wt 121 kg (266 lb)   LMP 01/20/2025   BMI 50.29 kg/m²       Review of Systems   Constitutional: Negative.    HENT: Negative.     Eyes: Negative.    Respiratory: Negative.     Cardiovascular: Negative.    Gastrointestinal: Negative.    Endocrine: Negative.    Genitourinary:  Positive for vaginal bleeding.   Musculoskeletal: Negative.    Skin: Negative.    Allergic/Immunologic: Negative.    Neurological: Negative.    Hematological: Negative.    Psychiatric/Behavioral: Negative.         Objective   Physical Exam  Endometrial Biopsy Procedure:     Informed consent was obtained and risks described as bleeding, cramping, uterine perforation,and infection. She desired to proceed. A speculum was inserted into the vagina and the cervix visualized and cleansed with betadine. An Allis clamp was used to grasp the anterior cervix. The pipelle was inserted and the plunger pulled back. The pipelle was rotated and withdrawn from the uterus with return of moderate amount of endometrial tissue. A total of 3 passes were made with the pipelle. " The Allis clamp was then removed and hemostasis ensured. The patient tolerated the procedure well. Tissue was sent for pathologic examination.    Assessment & Plan   Diagnoses and all orders for this visit:    1. Special screening (Primary)  -     POC Pregnancy, Urine    2. Abnormal uterine bleeding (AUB)  -     Reference Histopathology    Other orders  -     Norethin-Eth Estrad-Fe Biphas (Lo Loestrin Fe) 1 MG-10 MCG / 10 MCG tablet; Take 1 tablet by mouth Daily.  Dispense: 28 tablet; Refill: 3      Discussed ultrasound with patient, she does have an area within her endometrium that could be a polyp, this could be an explanation for her bleeding  Endometrial biopsy is sent to pathology  She can continue Loloestrin  Plan follow-up with Dr. Mcclain in 2 weeks to review pathology and discuss treatment options  Bleeding precautions advised           Total time spent today with Sarah  was 30 minutes (level 4).  Greater than 50% of the time was spent coordinating care, answering her questions and counseling regarding pathophysiology of her presenting problem along with plans for any diagnositc work-up and treatment.

## 2025-01-28 LAB
DX ICD CODE: NORMAL
PATH REPORT.FINAL DX SPEC: NORMAL
PATH REPORT.GROSS SPEC: NORMAL
PATH REPORT.SITE OF ORIGIN SPEC: NORMAL
PATHOLOGIST NAME: NORMAL
PAYMENT PROCEDURE: NORMAL

## 2025-02-06 ENCOUNTER — OFFICE VISIT (OUTPATIENT)
Dept: OBSTETRICS AND GYNECOLOGY | Age: 45
End: 2025-02-06
Payer: MEDICAID

## 2025-02-06 VITALS
DIASTOLIC BLOOD PRESSURE: 94 MMHG | BODY MASS INDEX: 49.69 KG/M2 | HEIGHT: 61 IN | WEIGHT: 263.2 LBS | SYSTOLIC BLOOD PRESSURE: 130 MMHG

## 2025-02-06 DIAGNOSIS — N93.9 ABNORMAL UTERINE BLEEDING (AUB): Primary | ICD-10-CM

## 2025-02-06 NOTE — PROGRESS NOTES
[facility]   Obstetrics and Gynecology     2025      Patient:  Sarah Chan   MR#:[id number]    Office note    Chief Complaint   Patient presents with    Follow-up     CC: gyn f/u AUB. Pt c/o chin hair growth.        Subjective     History of Present Illness  44 y.o. female  presenting to discuss abnormal uterine bleeding after undergoing EMB that was insufficient.  Ultrasound shows echogenic focus, possible polyp.      Relevant data reviewed: US Non-ob Transvaginal (2025 13:20)       Patient Active Problem List   Diagnosis    Screening mammogram for breast cancer    Irregular menses    Anemia    Abnormal uterine bleeding (AUB)       Past Medical History:   Diagnosis Date    Bipolar 1 disorder     Diabetes     Encounter for blood transfusion     Hypertension     Irregular menses      Past Surgical History:   Procedure Laterality Date    TONSILLECTOMY       Obstetric History:  OB History          0    Para   0    Term   0       0    AB   0    Living   0         SAB   0    IAB   0    Ectopic   0    Molar   0    Multiple   0    Live Births   0               Menstrual History:     Patient's last menstrual period was 2025 (exact date).       The patient has never been pregnant.  Family History   Problem Relation Age of Onset    Stroke Father      Social History     Tobacco Use    Smoking status: Never    Smokeless tobacco: Never   Vaping Use    Vaping status: Never Used   Substance Use Topics    Alcohol use: Yes     Comment: social    Drug use: Never     Patient has no known allergies.    Current Outpatient Medications:     cyanocobalamin (VITAMIN B-12) 500 MCG tablet, Take 2 tablets by mouth Daily., Disp: , Rfl:     ferrous sulfate 325 (65 FE) MG tablet, Take 1 tablet by mouth Daily With Breakfast., Disp: 30 tablet, Rfl: 0    folic acid (FOLVITE) 1 MG tablet, Take 1 tablet by mouth Daily., Disp: , Rfl:     lisinopril-hydrochlorothiazide (PRINZIDE,ZESTORETIC) 20-25  "MG per tablet, Take 1 tablet by mouth Daily., Disp: , Rfl:     metFORMIN (GLUCOPHAGE) 1000 MG tablet, Take 1 tablet by mouth., Disp: , Rfl:     Norethin-Eth Estrad-Fe Biphas (Lo Loestrin Fe) 1 MG-10 MCG / 10 MCG tablet, Take 1 tablet by mouth Daily., Disp: 28 tablet, Rfl: 3    nystatin (MYCOSTATIN) 337507 UNIT/GM cream, , Disp: , Rfl:     ondansetron ODT (ZOFRAN-ODT) 4 MG disintegrating tablet, Take 1 tablet by mouth., Disp: , Rfl:     sertraline (ZOLOFT) 50 MG tablet, Take 1 tablet by mouth Daily., Disp: , Rfl:     triamcinolone (KENALOG) 0.1 % cream, Apply  topically to the appropriate area as directed., Disp: , Rfl:     famotidine (PEPCID) 20 MG tablet, Take 1 tablet by mouth. (Patient not taking: Reported on 2/6/2025), Disp: , Rfl:       Review of Systems   All other systems reviewed and are negative.      BP Readings from Last 3 Encounters:   02/06/25 130/94   01/23/25 136/84   12/12/24 128/78      Wt Readings from Last 3 Encounters:   02/06/25 119 kg (263 lb 3.2 oz)   01/23/25 121 kg (266 lb)   12/12/24 117 kg (257 lb 6.4 oz)      BMI: Estimated body mass index is 50.34 kg/m² as calculated from the following:    Height as of this encounter: 154 cm (60.63\").    Weight as of this encounter: 119 kg (263 lb 3.2 oz). BSA: Estimated body surface area is 2.11 meters squared as calculated from the following:    Height as of this encounter: 154 cm (60.63\").    Weight as of this encounter: 119 kg (263 lb 3.2 oz).    Objective   Physical Exam  Vitals and nursing note reviewed.   Constitutional:       Appearance: Normal appearance.   HENT:      Head: Normocephalic and atraumatic.   Pulmonary:      Effort: Pulmonary effort is normal.   Neurological:      Mental Status: She is alert and oriented to person, place, and time.   Psychiatric:         Mood and Affect: Mood normal.         Assessment & Plan     Diagnoses and all orders for this visit:    1. Abnormal uterine bleeding (AUB) (Primary)  Overview:  - Patient currently " on OCPs, which she states helps somewhat.  States that periods have been irregular her entire life.  -Ultrasound shows enlarged uterus 12.3 cm and echogenic focus possible endometrial polyp.  -I discussed with her that it is recommended that endometrial polyps be removed and we discussed performing hysteroscopy dilation and curettage with polypectomy.  -I discussed the risks, benefits, and alternatives of this procedure including pain, bleeding, infection, damage to surrounding structures including uterine perforation and anesthesia risks.  -We discussed her ongoing difficulty with bleeding and I discussed the possibility with her of endometrial ablation versus IUD placement.  She is leaning towards IUD placement, though is not 100% sure.  -I have placed orders for surgery as well as a Mirena IUD which we will have ready for patient on the day of surgery.        I spent 25 minutes caring for Sarah Chan on this date of service. This time includes time spent by me in the following activities: preparing for the visit, reviewing tests, obtaining and/or reviewing a separately obtained history, performing a medically appropriate examination and/or evaluation, counseling and educating the patient/family/caregiver, ordering medications, tests, or procedures and documenting information in the medical record.  This time does NOT include time spent on separately reported services.      No follow-ups on file.    Inez Mcclain MD   2/6/2025 10:41 EST

## 2025-03-17 ENCOUNTER — PRE-ADMISSION TESTING (OUTPATIENT)
Dept: PREADMISSION TESTING | Facility: HOSPITAL | Age: 45
End: 2025-03-17
Payer: MEDICAID

## 2025-03-17 VITALS
SYSTOLIC BLOOD PRESSURE: 143 MMHG | RESPIRATION RATE: 16 BRPM | HEART RATE: 78 BPM | HEIGHT: 64 IN | WEIGHT: 266 LBS | OXYGEN SATURATION: 98 % | BODY MASS INDEX: 45.41 KG/M2 | TEMPERATURE: 98.3 F | DIASTOLIC BLOOD PRESSURE: 92 MMHG

## 2025-03-17 LAB
ANION GAP SERPL CALCULATED.3IONS-SCNC: 8 MMOL/L (ref 5–15)
BUN SERPL-MCNC: 9 MG/DL (ref 6–20)
BUN/CREAT SERPL: 15.3 (ref 7–25)
CALCIUM SPEC-SCNC: 8.7 MG/DL (ref 8.6–10.5)
CHLORIDE SERPL-SCNC: 103 MMOL/L (ref 98–107)
CO2 SERPL-SCNC: 27 MMOL/L (ref 22–29)
CREAT SERPL-MCNC: 0.59 MG/DL (ref 0.57–1)
DEPRECATED RDW RBC AUTO: 41.9 FL (ref 37–54)
EGFRCR SERPLBLD CKD-EPI 2021: 113.4 ML/MIN/1.73
ERYTHROCYTE [DISTWIDTH] IN BLOOD BY AUTOMATED COUNT: 13.3 % (ref 12.3–15.4)
GLUCOSE SERPL-MCNC: 153 MG/DL (ref 65–99)
HCT VFR BLD AUTO: 41.4 % (ref 34–46.6)
HGB BLD-MCNC: 13.8 G/DL (ref 12–15.9)
MCH RBC QN AUTO: 28.8 PG (ref 26.6–33)
MCHC RBC AUTO-ENTMCNC: 33.3 G/DL (ref 31.5–35.7)
MCV RBC AUTO: 86.3 FL (ref 79–97)
PLATELET # BLD AUTO: 406 10*3/MM3 (ref 140–450)
PMV BLD AUTO: 9.4 FL (ref 6–12)
POTASSIUM SERPL-SCNC: 3.8 MMOL/L (ref 3.5–5.2)
QT INTERVAL: 383 MS
QTC INTERVAL: 392 MS
RBC # BLD AUTO: 4.8 10*6/MM3 (ref 3.77–5.28)
SODIUM SERPL-SCNC: 138 MMOL/L (ref 136–145)
WBC NRBC COR # BLD AUTO: 7.66 10*3/MM3 (ref 3.4–10.8)

## 2025-03-17 PROCEDURE — 36415 COLL VENOUS BLD VENIPUNCTURE: CPT

## 2025-03-17 PROCEDURE — 93005 ELECTROCARDIOGRAM TRACING: CPT

## 2025-03-17 PROCEDURE — 85027 COMPLETE CBC AUTOMATED: CPT

## 2025-03-17 PROCEDURE — 80048 BASIC METABOLIC PNL TOTAL CA: CPT

## 2025-03-17 NOTE — DISCHARGE INSTRUCTIONS
Take the following medications the morning of surgery:    PEPCID, ZOLOFT    If you are on prescription narcotic pain medication to control your pain you may also take that medication the morning of surgery.      General Instructions:     Do not eat solid food after midnight the night before surgery.  Clear liquids day of surgery are allowed but must be stopped at least two hours before your hospital arrival time.       Allowed clear liquids      Water, sodas, and tea or coffee with no cream or milk added.       12 to 20 ounces of a clear liquid that contains carbohydrates is recommended.  If non-diabetic, have Gatorade or Powerade.  If diabetic, have G2 or Powerade Zero.     Do not have liquids red in color.  Do not consume chicken, beef, pork or vegetable broth or bouillon cubes of any variety as they are not considered clear liquids and are not allowed.      Patients who avoid smoking, chewing tobacco and alcohol for 4 weeks prior to surgery have a reduced risk of post-operative complications.    Do not smoke, use chewing tobacco or drink alcohol the day of surgery.   Bring any papers given to you in the doctor’s office.  Wear clean comfortable clothes.  Do not wear contact lenses, false eyelashes or make-up.  Bring a case for your glasses.   Remove all piercings.  Leave jewelry and any other valuables at home.  Hair extensions with metal clips must be removed prior to surgery.  The Pre-Admission Testing nurse will instruct you to bring medications if unable to obtain an accurate list in Pre-Admission Testing.    Day of surgery you will need to let the preoperative nurse know the last time you took each of your medications.  To ensure a safe environment for patients and staff, we kindly ask that children under the age of 16 not accompany patients.  If you must bring a dependent child or dependent adult please ensure a responsible adult, other than yourself, is present to supervise them.          Preventing a  Surgical Site Infection:  For 2 to 3 days before surgery, avoid shaving with a razor because the razor can irritate skin and make it easier to develop an infection.    Any areas of open skin can increase the risk of a post-operative wound infection by allowing bacteria to enter and travel throughout the body.  Notify your surgeon if you have any skin wounds / rashes even if it is not near the expected surgical site.  The area will need assessed to determine if surgery should be delayed until it is healed.  The night prior to surgery shower using a fresh bar of anti-bacterial soap (such as Dial) and clean washcloth.  Sleep in a clean bed with clean clothing.  Do not allow pets to sleep with you.  Shower on the morning of surgery using a fresh bar of anti-bacterial soap (such as Dial) and clean washcloth.  Dry with a clean towel and dress in clean clothing.  Ask your surgeon if you will be receiving antibiotics prior to surgery.  Make sure you, your family, and all healthcare providers clean their hands with soap and water or an alcohol based hand  before caring for you or your wound.    Day of surgery:  Your arrival time is approximately two hours before your scheduled surgery time.  Please note if you have an early arrival time the surgery doors do not open before 5:00 AM.  Upon arrival, a Pre-op nurse and Anesthesiologist will review your health history, obtain vital signs, and answer questions you may have.  The only belongings needed at this time will be a list of your home medications and if applicable your C-PAP/BI-PAP machine.  A Pre-op nurse will start an IV and you may receive medication in preparation for surgery, including something to help you relax.     Please be aware that surgery does come with discomfort.  We want to make every effort to control your discomfort so please discuss any uncontrolled symptoms with your nurse.   Your doctor will most likely have prescribed pain medications.      If  you are going home after surgery you will receive individualized written care instructions before being discharged.  A responsible adult must drive you to and from the hospital on the day of your surgery and ideally stay with you through the night.   .  Discharge prescriptions can be filled by the hospital pharmacy during regular pharmacy hours.  If you are having surgery late in the day/evening your prescription may be e-prescribed to your pharmacy.  Please verify your pharmacy hours or chose a 24 hour pharmacy to avoid not having access to your prescription because your pharmacy has closed for the day.    CHLORHEXIDINE CLOTH INSTRUCTIONS  The morning of surgery follow these instructions using the Chlorhexidine cloths you've been given.  These steps reduce bacteria on the body.  Do not use the cloths near your eyes, ears mouth, genitalia or on open wounds.  Throw the cloths away after use but do not try to flush them down a toilet.      Open and remove one cloth at a time from the package.    Leave the cloth unfolded and begin the bathing.  Massage the skin with the cloths using gentle pressure to remove bacteria.  Do not scrub harshly.   Follow the steps below with one 2% CHG cloth per area (6 total cloths).  One cloth for neck, shoulders and chest.  One cloth for both arms, hands, fingers and underarms (do underarms last).  One cloth for the abdomen followed by groin.  One cloth for right leg and foot including between the toes.  One cloth for left leg and foot including between the toes.  The last cloth is to be used for the back of the neck, back and buttocks.    Allow the CHG to air dry 3 minutes on the skin which will give it time to work and decrease the chance of irritation.  The skin may feel sticky until it is dry.  Do not rinse with water or any other liquid or you will lose the beneficial effects of the CHG.  If mild skin irritation occurs, do rinse the skin to remove the CHG.  Report this to the nurse  at time of admission.  Do not apply lotions, creams, ointments, deodorants or perfumes after using the clothes. Dress in clean clothes before coming to the hospital.    If you have any questions please call Pre-Admission Testing at [phone].  Deductibles and co-payments are collected on the day of service. Please be prepared to pay the required co-pay, deductible or deposit on the day of service as defined by your plan.    Call your surgeon immediately if you experience any of the following symptoms:  Sore Throat  Shortness of Breath or difficulty breathing  Cough  Chills  Body soreness or muscle pain  Headache  Fever  New loss of taste or smell  Do not arrive for your surgery ill.  Your procedure will need to be rescheduled to another time.  You will need to call your physician before the day of surgery to avoid any unnecessary exposure to hospital staff as well as other patients.

## 2025-03-24 ENCOUNTER — HOSPITAL ENCOUNTER (OUTPATIENT)
Facility: HOSPITAL | Age: 45
Discharge: HOME | End: 2025-03-24
Attending: STUDENT IN AN ORGANIZED HEALTH CARE EDUCATION/TRAINING PROGRAM | Admitting: STUDENT IN AN ORGANIZED HEALTH CARE EDUCATION/TRAINING PROGRAM
Payer: MEDICAID

## 2025-03-24 ENCOUNTER — CLINICAL SUPPORT (OUTPATIENT)
Dept: OBSTETRICS AND GYNECOLOGY | Age: 45
End: 2025-03-24
Payer: MEDICAID

## 2025-03-24 ENCOUNTER — ANESTHESIA EVENT (OUTPATIENT)
Dept: PERIOP | Facility: HOSPITAL | Age: 45
End: 2025-03-24
Payer: MEDICAID

## 2025-03-24 ENCOUNTER — ANESTHESIA (OUTPATIENT)
Dept: PERIOP | Facility: HOSPITAL | Age: 45
End: 2025-03-24
Payer: MEDICAID

## 2025-03-24 VITALS
DIASTOLIC BLOOD PRESSURE: 49 MMHG | BODY MASS INDEX: 49.09 KG/M2 | RESPIRATION RATE: 15 BRPM | HEIGHT: 62 IN | WEIGHT: 266.76 LBS | TEMPERATURE: 97.8 F | SYSTOLIC BLOOD PRESSURE: 105 MMHG | HEART RATE: 63 BPM | OXYGEN SATURATION: 92 %

## 2025-03-24 DIAGNOSIS — N93.9 ABNORMAL UTERINE BLEEDING (AUB): ICD-10-CM

## 2025-03-24 DIAGNOSIS — N93.9 ABNORMAL UTERINE BLEEDING: Primary | ICD-10-CM

## 2025-03-24 DIAGNOSIS — Z30.430 ENCOUNTER FOR IUD INSERTION: Primary | ICD-10-CM

## 2025-03-24 LAB
B-HCG UR QL: NEGATIVE
EXPIRATION DATE: NORMAL
GLUCOSE BLDC GLUCOMTR-MCNC: 159 MG/DL (ref 70–130)
INTERNAL NEGATIVE CONTROL: NORMAL
INTERNAL POSITIVE CONTROL: NORMAL
Lab: NORMAL

## 2025-03-24 PROCEDURE — 25010000002 MIDAZOLAM PER 1 MG: Performed by: NURSE ANESTHETIST, CERTIFIED REGISTERED

## 2025-03-24 PROCEDURE — 81025 URINE PREGNANCY TEST: CPT | Performed by: ANESTHESIOLOGY

## 2025-03-24 PROCEDURE — 82948 REAGENT STRIP/BLOOD GLUCOSE: CPT

## 2025-03-24 PROCEDURE — 25010000002 KETOROLAC TROMETHAMINE PER 15 MG: Performed by: NURSE ANESTHETIST, CERTIFIED REGISTERED

## 2025-03-24 PROCEDURE — 25010000002 FENTANYL CITRATE (PF) 50 MCG/ML SOLUTION: Performed by: NURSE ANESTHETIST, CERTIFIED REGISTERED

## 2025-03-24 PROCEDURE — 25010000002 MIDAZOLAM PER 1 MG: Performed by: ANESTHESIOLOGY

## 2025-03-24 PROCEDURE — 58558 HYSTEROSCOPY BIOPSY: CPT | Performed by: STUDENT IN AN ORGANIZED HEALTH CARE EDUCATION/TRAINING PROGRAM

## 2025-03-24 PROCEDURE — 25010000002 DEXAMETHASONE SODIUM PHOSPHATE 20 MG/5ML SOLUTION: Performed by: NURSE ANESTHETIST, CERTIFIED REGISTERED

## 2025-03-24 PROCEDURE — S0260 H&P FOR SURGERY: HCPCS | Performed by: STUDENT IN AN ORGANIZED HEALTH CARE EDUCATION/TRAINING PROGRAM

## 2025-03-24 PROCEDURE — 25010000002 ONDANSETRON PER 1 MG: Performed by: NURSE ANESTHETIST, CERTIFIED REGISTERED

## 2025-03-24 PROCEDURE — 58300 INSERT INTRAUTERINE DEVICE: CPT | Performed by: STUDENT IN AN ORGANIZED HEALTH CARE EDUCATION/TRAINING PROGRAM

## 2025-03-24 PROCEDURE — 88305 TISSUE EXAM BY PATHOLOGIST: CPT | Performed by: STUDENT IN AN ORGANIZED HEALTH CARE EDUCATION/TRAINING PROGRAM

## 2025-03-24 PROCEDURE — 25810000003 LACTATED RINGERS PER 1000 ML: Performed by: ANESTHESIOLOGY

## 2025-03-24 PROCEDURE — 31500 INSERT EMERGENCY AIRWAY: CPT | Performed by: ANESTHESIOLOGY

## 2025-03-24 PROCEDURE — 25010000002 PROPOFOL 10 MG/ML EMULSION: Performed by: NURSE ANESTHETIST, CERTIFIED REGISTERED

## 2025-03-24 PROCEDURE — 25010000002 GLYCOPYRROLATE 0.2 MG/ML SOLUTION: Performed by: NURSE ANESTHETIST, CERTIFIED REGISTERED

## 2025-03-24 PROCEDURE — C1889 IMPLANT/INSERT DEVICE, NOC: HCPCS | Performed by: STUDENT IN AN ORGANIZED HEALTH CARE EDUCATION/TRAINING PROGRAM

## 2025-03-24 PROCEDURE — 25010000002 LIDOCAINE 2% SOLUTION: Performed by: NURSE ANESTHETIST, CERTIFIED REGISTERED

## 2025-03-24 PROCEDURE — 25010000002 HYDROMORPHONE PER 4 MG: Performed by: NURSE ANESTHETIST, CERTIFIED REGISTERED

## 2025-03-24 PROCEDURE — 25010000002 SUCCINYLCHOLINE PER 20 MG: Performed by: NURSE ANESTHETIST, CERTIFIED REGISTERED

## 2025-03-24 DEVICE — IUD LEVONORGESTREL MIRENA 52MG: Type: IMPLANTABLE DEVICE | Site: UTERUS | Status: FUNCTIONAL

## 2025-03-24 RX ORDER — TRAMADOL HYDROCHLORIDE 50 MG/1
50 TABLET ORAL EVERY 6 HOURS PRN
Qty: 5 TABLET | Refills: 0 | Status: SHIPPED | OUTPATIENT
Start: 2025-03-24

## 2025-03-24 RX ORDER — ONDANSETRON 2 MG/ML
4 INJECTION INTRAMUSCULAR; INTRAVENOUS ONCE AS NEEDED
Status: DISCONTINUED | OUTPATIENT
Start: 2025-03-24 | End: 2025-03-24 | Stop reason: HOSPADM

## 2025-03-24 RX ORDER — MELOXICAM 7.5 MG/1
7.5 TABLET ORAL DAILY
COMMUNITY
Start: 2025-03-20

## 2025-03-24 RX ORDER — FENTANYL CITRATE 50 UG/ML
50 INJECTION, SOLUTION INTRAMUSCULAR; INTRAVENOUS ONCE AS NEEDED
Status: DISCONTINUED | OUTPATIENT
Start: 2025-03-24 | End: 2025-03-24 | Stop reason: HOSPADM

## 2025-03-24 RX ORDER — SCOPOLAMINE 1 MG/3D
1 PATCH, EXTENDED RELEASE TRANSDERMAL ONCE
Status: DISCONTINUED | OUTPATIENT
Start: 2025-03-24 | End: 2025-03-24 | Stop reason: HOSPADM

## 2025-03-24 RX ORDER — EPHEDRINE SULFATE 50 MG/ML
5 INJECTION, SOLUTION INTRAVENOUS ONCE AS NEEDED
Status: DISCONTINUED | OUTPATIENT
Start: 2025-03-24 | End: 2025-03-24 | Stop reason: HOSPADM

## 2025-03-24 RX ORDER — FLUMAZENIL 0.1 MG/ML
0.2 INJECTION INTRAVENOUS AS NEEDED
Status: DISCONTINUED | OUTPATIENT
Start: 2025-03-24 | End: 2025-03-24 | Stop reason: HOSPADM

## 2025-03-24 RX ORDER — HYDROCODONE BITARTRATE AND ACETAMINOPHEN 5; 325 MG/1; MG/1
1 TABLET ORAL ONCE AS NEEDED
Status: DISCONTINUED | OUTPATIENT
Start: 2025-03-24 | End: 2025-03-24 | Stop reason: HOSPADM

## 2025-03-24 RX ORDER — NALOXONE HCL 0.4 MG/ML
0.2 VIAL (ML) INJECTION AS NEEDED
Status: DISCONTINUED | OUTPATIENT
Start: 2025-03-24 | End: 2025-03-24 | Stop reason: HOSPADM

## 2025-03-24 RX ORDER — LIDOCAINE HYDROCHLORIDE 10 MG/ML
0.5 INJECTION, SOLUTION INFILTRATION; PERINEURAL ONCE AS NEEDED
Status: DISCONTINUED | OUTPATIENT
Start: 2025-03-24 | End: 2025-03-24 | Stop reason: HOSPADM

## 2025-03-24 RX ORDER — DROPERIDOL 2.5 MG/ML
0.62 INJECTION, SOLUTION INTRAMUSCULAR; INTRAVENOUS
Status: DISCONTINUED | OUTPATIENT
Start: 2025-03-24 | End: 2025-03-24 | Stop reason: HOSPADM

## 2025-03-24 RX ORDER — HYDROMORPHONE HYDROCHLORIDE 1 MG/ML
0.5 INJECTION, SOLUTION INTRAMUSCULAR; INTRAVENOUS; SUBCUTANEOUS
Status: DISCONTINUED | OUTPATIENT
Start: 2025-03-24 | End: 2025-03-24 | Stop reason: HOSPADM

## 2025-03-24 RX ORDER — MIDAZOLAM HYDROCHLORIDE 1 MG/ML
INJECTION, SOLUTION INTRAMUSCULAR; INTRAVENOUS AS NEEDED
Status: DISCONTINUED | OUTPATIENT
Start: 2025-03-24 | End: 2025-03-24 | Stop reason: SURG

## 2025-03-24 RX ORDER — PROMETHAZINE HYDROCHLORIDE 25 MG/1
25 SUPPOSITORY RECTAL ONCE AS NEEDED
Status: DISCONTINUED | OUTPATIENT
Start: 2025-03-24 | End: 2025-03-24 | Stop reason: HOSPADM

## 2025-03-24 RX ORDER — SUCCINYLCHOLINE CHLORIDE 20 MG/ML
INJECTION INTRAMUSCULAR; INTRAVENOUS AS NEEDED
Status: DISCONTINUED | OUTPATIENT
Start: 2025-03-24 | End: 2025-03-24 | Stop reason: SURG

## 2025-03-24 RX ORDER — BENZOCAINE/MENTHOL 6 MG-10 MG
1 LOZENGE MUCOUS MEMBRANE 2 TIMES DAILY
Qty: 28 G | Refills: 0 | Status: SHIPPED | OUTPATIENT
Start: 2025-03-24

## 2025-03-24 RX ORDER — HYDRALAZINE HYDROCHLORIDE 20 MG/ML
5 INJECTION INTRAMUSCULAR; INTRAVENOUS
Status: DISCONTINUED | OUTPATIENT
Start: 2025-03-24 | End: 2025-03-24 | Stop reason: HOSPADM

## 2025-03-24 RX ORDER — OXYCODONE AND ACETAMINOPHEN 7.5; 325 MG/1; MG/1
1 TABLET ORAL EVERY 4 HOURS PRN
Status: DISCONTINUED | OUTPATIENT
Start: 2025-03-24 | End: 2025-03-24 | Stop reason: HOSPADM

## 2025-03-24 RX ORDER — MAGNESIUM HYDROXIDE 1200 MG/15ML
LIQUID ORAL AS NEEDED
Status: DISCONTINUED | OUTPATIENT
Start: 2025-03-24 | End: 2025-03-24 | Stop reason: HOSPADM

## 2025-03-24 RX ORDER — FAMOTIDINE 10 MG/ML
20 INJECTION, SOLUTION INTRAVENOUS ONCE
Status: COMPLETED | OUTPATIENT
Start: 2025-03-24 | End: 2025-03-24

## 2025-03-24 RX ORDER — ROCURONIUM BROMIDE 10 MG/ML
INJECTION, SOLUTION INTRAVENOUS AS NEEDED
Status: DISCONTINUED | OUTPATIENT
Start: 2025-03-24 | End: 2025-03-24 | Stop reason: SURG

## 2025-03-24 RX ORDER — SODIUM CHLORIDE, SODIUM LACTATE, POTASSIUM CHLORIDE, CALCIUM CHLORIDE 600; 310; 30; 20 MG/100ML; MG/100ML; MG/100ML; MG/100ML
9 INJECTION, SOLUTION INTRAVENOUS CONTINUOUS
Status: DISCONTINUED | OUTPATIENT
Start: 2025-03-24 | End: 2025-03-24 | Stop reason: HOSPADM

## 2025-03-24 RX ORDER — GLYCOPYRROLATE 0.2 MG/ML
INJECTION INTRAMUSCULAR; INTRAVENOUS AS NEEDED
Status: DISCONTINUED | OUTPATIENT
Start: 2025-03-24 | End: 2025-03-24 | Stop reason: SURG

## 2025-03-24 RX ORDER — LABETALOL HYDROCHLORIDE 5 MG/ML
5 INJECTION, SOLUTION INTRAVENOUS
Status: DISCONTINUED | OUTPATIENT
Start: 2025-03-24 | End: 2025-03-24 | Stop reason: HOSPADM

## 2025-03-24 RX ORDER — MIDAZOLAM HYDROCHLORIDE 1 MG/ML
1 INJECTION, SOLUTION INTRAMUSCULAR; INTRAVENOUS
Status: DISCONTINUED | OUTPATIENT
Start: 2025-03-24 | End: 2025-03-24 | Stop reason: HOSPADM

## 2025-03-24 RX ORDER — LIDOCAINE HYDROCHLORIDE 20 MG/ML
INJECTION, SOLUTION INFILTRATION; PERINEURAL AS NEEDED
Status: DISCONTINUED | OUTPATIENT
Start: 2025-03-24 | End: 2025-03-24 | Stop reason: SURG

## 2025-03-24 RX ORDER — FENTANYL CITRATE 50 UG/ML
INJECTION, SOLUTION INTRAMUSCULAR; INTRAVENOUS AS NEEDED
Status: DISCONTINUED | OUTPATIENT
Start: 2025-03-24 | End: 2025-03-24 | Stop reason: SURG

## 2025-03-24 RX ORDER — ONDANSETRON 2 MG/ML
INJECTION INTRAMUSCULAR; INTRAVENOUS AS NEEDED
Status: DISCONTINUED | OUTPATIENT
Start: 2025-03-24 | End: 2025-03-24 | Stop reason: SURG

## 2025-03-24 RX ORDER — DEXAMETHASONE SODIUM PHOSPHATE 4 MG/ML
INJECTION, SOLUTION INTRA-ARTICULAR; INTRALESIONAL; INTRAMUSCULAR; INTRAVENOUS; SOFT TISSUE AS NEEDED
Status: DISCONTINUED | OUTPATIENT
Start: 2025-03-24 | End: 2025-03-24 | Stop reason: SURG

## 2025-03-24 RX ORDER — KETOROLAC TROMETHAMINE 30 MG/ML
INJECTION, SOLUTION INTRAMUSCULAR; INTRAVENOUS AS NEEDED
Status: DISCONTINUED | OUTPATIENT
Start: 2025-03-24 | End: 2025-03-24 | Stop reason: SURG

## 2025-03-24 RX ORDER — SODIUM CHLORIDE 0.9 % (FLUSH) 0.9 %
3-10 SYRINGE (ML) INJECTION AS NEEDED
Status: DISCONTINUED | OUTPATIENT
Start: 2025-03-24 | End: 2025-03-24 | Stop reason: HOSPADM

## 2025-03-24 RX ORDER — SODIUM CHLORIDE 0.9 % (FLUSH) 0.9 %
3 SYRINGE (ML) INJECTION EVERY 12 HOURS SCHEDULED
Status: DISCONTINUED | OUTPATIENT
Start: 2025-03-24 | End: 2025-03-24 | Stop reason: HOSPADM

## 2025-03-24 RX ORDER — PROMETHAZINE HYDROCHLORIDE 25 MG/1
25 TABLET ORAL ONCE AS NEEDED
Status: DISCONTINUED | OUTPATIENT
Start: 2025-03-24 | End: 2025-03-24 | Stop reason: HOSPADM

## 2025-03-24 RX ORDER — PROPOFOL 10 MG/ML
VIAL (ML) INTRAVENOUS AS NEEDED
Status: DISCONTINUED | OUTPATIENT
Start: 2025-03-24 | End: 2025-03-24 | Stop reason: SURG

## 2025-03-24 RX ORDER — IPRATROPIUM BROMIDE AND ALBUTEROL SULFATE 2.5; .5 MG/3ML; MG/3ML
3 SOLUTION RESPIRATORY (INHALATION) ONCE AS NEEDED
Status: DISCONTINUED | OUTPATIENT
Start: 2025-03-24 | End: 2025-03-24 | Stop reason: HOSPADM

## 2025-03-24 RX ORDER — ATROPINE SULFATE 0.4 MG/ML
0.4 INJECTION, SOLUTION INTRAMUSCULAR; INTRAVENOUS; SUBCUTANEOUS ONCE AS NEEDED
Status: DISCONTINUED | OUTPATIENT
Start: 2025-03-24 | End: 2025-03-24 | Stop reason: HOSPADM

## 2025-03-24 RX ORDER — FENTANYL CITRATE 50 UG/ML
50 INJECTION, SOLUTION INTRAMUSCULAR; INTRAVENOUS
Status: DISCONTINUED | OUTPATIENT
Start: 2025-03-24 | End: 2025-03-24 | Stop reason: HOSPADM

## 2025-03-24 RX ORDER — DIPHENHYDRAMINE HYDROCHLORIDE 50 MG/ML
12.5 INJECTION, SOLUTION INTRAMUSCULAR; INTRAVENOUS
Status: DISCONTINUED | OUTPATIENT
Start: 2025-03-24 | End: 2025-03-24 | Stop reason: HOSPADM

## 2025-03-24 RX ADMIN — MIDAZOLAM 2 MG: 1 INJECTION INTRAMUSCULAR; INTRAVENOUS at 10:56

## 2025-03-24 RX ADMIN — GLYCOPYRROLATE 0.2 MG: 0.2 INJECTION INTRAMUSCULAR; INTRAVENOUS at 10:56

## 2025-03-24 RX ADMIN — MIDAZOLAM HYDROCHLORIDE 1 MG: 1 INJECTION, SOLUTION INTRAMUSCULAR; INTRAVENOUS at 09:05

## 2025-03-24 RX ADMIN — ROCURONIUM BROMIDE 10 MG: 10 INJECTION INTRAVENOUS at 11:09

## 2025-03-24 RX ADMIN — HYDROMORPHONE HYDROCHLORIDE 0.5 MG: 1 INJECTION, SOLUTION INTRAMUSCULAR; INTRAVENOUS; SUBCUTANEOUS at 12:10

## 2025-03-24 RX ADMIN — SODIUM CHLORIDE, SODIUM LACTATE, POTASSIUM CHLORIDE, AND CALCIUM CHLORIDE 9 ML/HR: 600; 310; 30; 20 INJECTION, SOLUTION INTRAVENOUS at 09:05

## 2025-03-24 RX ADMIN — OXYCODONE HYDROCHLORIDE AND ACETAMINOPHEN 1 TABLET: 7.5; 325 TABLET ORAL at 12:28

## 2025-03-24 RX ADMIN — Medication 3 ML: at 09:07

## 2025-03-24 RX ADMIN — LIDOCAINE HYDROCHLORIDE 100 MG: 20 INJECTION, SOLUTION INFILTRATION; PERINEURAL at 11:00

## 2025-03-24 RX ADMIN — ONDANSETRON 4 MG: 2 INJECTION, SOLUTION INTRAMUSCULAR; INTRAVENOUS at 11:46

## 2025-03-24 RX ADMIN — FAMOTIDINE 20 MG: 10 INJECTION INTRAVENOUS at 09:05

## 2025-03-24 RX ADMIN — DEXAMETHASONE SODIUM PHOSPHATE 4 MG: 4 INJECTION, SOLUTION INTRAMUSCULAR; INTRAVENOUS at 11:06

## 2025-03-24 RX ADMIN — KETOROLAC TROMETHAMINE 30 MG: 30 INJECTION, SOLUTION INTRAMUSCULAR at 11:34

## 2025-03-24 RX ADMIN — PROPOFOL 50 MG: 10 INJECTION, EMULSION INTRAVENOUS at 11:33

## 2025-03-24 RX ADMIN — ONDANSETRON 4 MG: 2 INJECTION, SOLUTION INTRAMUSCULAR; INTRAVENOUS at 11:33

## 2025-03-24 RX ADMIN — SUCCINYLCHOLINE CHLORIDE 180 MG: 20 INJECTION, SOLUTION INTRAMUSCULAR; INTRAVENOUS; PARENTERAL at 11:09

## 2025-03-24 RX ADMIN — SCOPOLAMINE 1 PATCH: 1.5 PATCH, EXTENDED RELEASE TRANSDERMAL at 09:07

## 2025-03-24 RX ADMIN — PROPOFOL 200 MG: 10 INJECTION, EMULSION INTRAVENOUS at 11:00

## 2025-03-24 RX ADMIN — FENTANYL CITRATE 50 MCG: 50 INJECTION, SOLUTION INTRAMUSCULAR; INTRAVENOUS at 11:04

## 2025-03-24 NOTE — ANESTHESIA POSTPROCEDURE EVALUATION
Patient: Sarah Chan    Procedure Summary       Date: 03/24/25 Room / Location: [facility] OR [address] [place] [address] MAIN OR    Anesthesia Start: 1054 Anesthesia Stop: 1157    Procedure: DILATATION AND CURETTAGE HYSTEROSCOPY and placement of intrauteerine device (Uterus) Diagnosis:       Abnormal uterine bleeding (AUB)      (Abnormal uterine bleeding (AUB) [N93.9])    Surgeons: Inez Mcclain MD Provider: Iva Belle MD    Anesthesia Type: general ASA Status: 3            Anesthesia Type: general    Vitals  Vitals Value Taken Time   /59 03/24/25 13:00   Temp 36.7 °C (98.1 °F) 03/24/25 11:56   Pulse 63 03/24/25 13:06   Resp 16 03/24/25 12:45   SpO2 91 % 03/24/25 13:06   Vitals shown include unfiled device data.        Post Anesthesia Care and Evaluation    Patient location during evaluation: bedside  Patient participation: complete - patient participated  Level of consciousness: awake  Pain management: adequate    Airway patency: patent  Anesthetic complications: No anesthetic complications    Cardiovascular status: acceptable  Respiratory status: acceptable  Hydration status: acceptable

## 2025-03-24 NOTE — ANESTHESIA PROCEDURE NOTES
Airway  Reason: elective    Date/Time: 3/24/2025 11:01 AM  Airway not difficult    General Information and Staff    Patient location during procedure: OR  Anesthesiologist: Iva Belle MD  CRNA/CAA: Brice Joseph CRNA    Indications and Patient Condition  Indications for airway management: airway protection    Preoxygenated: yes    Mask difficulty assessment: 0 - not attempted    Final Airway Details    Final airway type: supraglottic airway      Successful airway: LMA and unique  Size: 4   Number of attempts at approach: 1  Assessment: lips, teeth, and gum same as pre-op and atraumatic intubation

## 2025-03-24 NOTE — H&P
Patient Care Team:  Guille Stringer NP as PCP - General (Nurse Practitioner)    Chief complaint abnormal uterine bleeding     Subjective     Patient is a 44 y.o. female presents with abnormal uterine bleeding for hysteroscopy dilation and curettage with IUD placement. She is doing well today with no new complaints or concerns.      Review of Systems   Pertinent items are noted in HPI    History  Past Medical History:   Diagnosis Date    Bipolar 1 disorder     Diabetes     TYPE 2    Encounter for blood transfusion     NO REACTION    History of Bell's palsy 2023    Hypertension     Irregular menses      Past Surgical History:   Procedure Laterality Date    TONSILLECTOMY       Family History   Problem Relation Age of Onset    Stroke Father     Malig Hyperthermia Neg Hx      Social History     Tobacco Use    Smoking status: Never    Smokeless tobacco: Never   Vaping Use    Vaping status: Never Used   Substance Use Topics    Alcohol use: Yes     Comment: Very rarely    Drug use: Never       Allergies  No Known Allergies    Medications  Medications Prior to Admission   Medication Sig Dispense Refill Last Dose/Taking    cyanocobalamin (VITAMIN B-12) 500 MCG tablet Take 2 tablets by mouth Daily.   Past Month    famotidine (PEPCID) 20 MG tablet Take 1 tablet by mouth As Needed.   Past Week    ferrous sulfate 325 (65 FE) MG tablet Take 1 tablet by mouth Daily With Breakfast. 30 tablet 0 3/23/2025 at  8:00 AM    folic acid (FOLVITE) 1 MG tablet Take 1 tablet by mouth Daily.   3/23/2025 at  8:00 AM    Levonorgestrel (Mirena, 52 MG,) 20 MCG/DAY intrauterine device IUD To be inserted one time by prescriber. Route intrauterine. 1 each 0 Taking    lisinopril-hydrochlorothiazide (PRINZIDE,ZESTORETIC) 20-25 MG per tablet Take 1 tablet by mouth Daily. DO NOT TAKE FOR 24 HOURS BEFORE SURGERY   3/23/2025 at  8:00 AM    meloxicam (MOBIC) 7.5 MG tablet Take 1 tablet by mouth Daily.   3/23/2025 at  9:00 AM    metFORMIN (GLUCOPHAGE)  "1000 MG tablet Take 1 tablet by mouth Daily With Breakfast.   3/23/2025 at  8:00 AM    Norethin-Eth Estrad-Fe Biphas (Lo Loestrin Fe) 1 MG-10 MCG / 10 MCG tablet Take 1 tablet by mouth Daily. 28 tablet 3 3/23/2025 at  8:00 AM    nystatin (MYCOSTATIN) 493362 UNIT/GM cream 1 Application As Needed.   Taking As Needed    ondansetron ODT (ZOFRAN-ODT) 4 MG disintegrating tablet Take 1 tablet by mouth.   Past Week    sertraline (ZOLOFT) 50 MG tablet Take 1 tablet by mouth Daily.   3/23/2025 at  8:00 AM    triamcinolone (KENALOG) 0.1 % cream Apply 1 Application topically to the appropriate area as directed As Needed.   Taking As Needed       Objective     Vital Signs  Vitals:    03/24/25 0826   BP: (!) 117/103   BP Location: Right arm   Patient Position: Lying   Pulse: 82   Resp: 18   Temp: 98.4 °F (36.9 °C)   TempSrc: Oral   SpO2: 100%   Weight: 121 kg (266 lb 12.1 oz)   Height: 157.5 cm (62\")       Physical Exam:      General Appearance:    Alert, cooperative, in no acute distress   Lungs:     Clear to auscultation,respirations regular.    Heart:    Regular rhythm and normal rate.   Abdomen:     Normal bowel sounds, no masses, soft non-tender, non-distended, no guarding, no rebound tenderness   Genitalia:    deferred   Extremities:   Moves all extremities well, no edema, no cyanosis, no              redness       Results Review:      Lab Results (last 48 hours)       Procedure Component Value Units Date/Time    POC Glucose Once [[id number]]  (Abnormal) Collected: 03/24/25 0909    Specimen: Blood Updated: 03/24/25 0910     Glucose 159 mg/dL     POC Urine Pregnancy [[id number]]  (Normal) Collected: 03/24/25 0825    Specimen: Urine Updated: 03/24/25 0852     HCG, Urine, QL Negative     Lot Number [id number]     Internal Positive Control Passed     Internal Negative Control Passed     Expiration Date 8/5/2026             Imaging Results (Last 48 Hours)       ** No results found for the last 48 hours. **            Assessment & " Plan   Patient is a 44 y.o. female presents with abnormal uterine bleeding for hysteroscopy dilation and curettage with IUD placement.       Abnormal uterine bleeding (AUB)    Abnormal uterine bleeding      - All patient and family questions answered.   - Consents signed and placed on chart.   - Plan for DC from PACU.       Inez Mcclain MD  03/24/25  10:36 EDT

## 2025-03-24 NOTE — OP NOTE
Subjective     Date of Service:  03/24/25    Pre-operative diagnosis(es): Abnormal uterine bleeding     Post-operative diagnosis(es): Normal uterine bleeding   Procedure(s): Hysteroscopy dilation and curettage with intrauterine device placement         Surgeon:    Inez Mcclain MD       EBL: 5 cc         Anesthesia:  General Anesthesia       Objective      Operative findings: Normal external genitalia, normal cervix without lesions, uterus enlarged to about 12 cm, endometrial cavity with thickened fluffy tissue and some small areas of calcification.     Description of Procedure:   The risks, benefits, and alternatives of the procedure were discussed with the patient. She understood the risks of the procedure include but are not limited to uterine perforation, fluid overload. and rare risk of death. She wished to proceed and signed the consent.    The patient was taken to the OR where general anesthesia was administered. She was placed in the dorsal lithotomy position with Joel stirrups. The patient was then prepared and draped in the normal sterile fashion.  A weighted speculum was inserted in the posterior aspect of the vagina. A single-tooth tenaculum was used to grasp the anterior lip of the cervix. The uterus was carefully sounded to 12 cm. The cervical os was sequentially dilated to accommodate the 5-mm hysteroscope using Lisa dilators. A 5-mm 30-degree hysteroscope was introduced under direct visualization, and the uterus was distended with normal saline.  The aforementioned findings were noted. The hysteroscope was then withdrawn and the uterus was curetted in a clockwise fashion until a gritty feeling was noted in all aspects of uterus.   Next a Mirena IUD was inserted to the fundus and the strings were trimmed to 2 to 3 cm.  The endometrial scrapings were sent to Pathology. The tenaculum was removed from the cervix and good hemostasis was noted at puncture site.    The patient tolerated the procedure  Virtual Regular Visit    Verification of patient location:    Patient is located at Home in the following state in which I hold an active license [place]      Assessment/Plan:    Problem List Items Addressed This Visit          Other    Anxiety    Relevant Medications    sertraline (ZOLOFT) 25 mg tablet    ALPRAZolam (XANAX) 0.5 mg tablet   Continue Zoloft 25 mg daily as ordered  Continue Xanax prn as ordered for anxiety  F/up in 6 months      Depression Screening and Follow-up Plan: Patient was screened for depression during today's encounter. They screened negative with a PHQ-2 score of 0.        Reason for visit is   Chief Complaint   Patient presents with    Medication Refill     Medication renew     Virtual Regular Visit        Encounter provider ANDRES Manning    Provider located at [address] [id number]      Recent Visits  No visits were found meeting these conditions.  Showing recent visits within past 7 days and meeting all other requirements  Today's Visits  Date Type Provider Dept   12/18/23 Telemedicine ANDRES Manning [facility]   Showing today's visits and meeting all other requirements  Future Appointments  No visits were found meeting these conditions.  Showing future appointments within next 150 days and meeting all other requirements       The patient was identified by name and date of birth. Shadia Dalton was informed that this is a telemedicine visit and that the visit is being conducted through the [email or web address] platform. She agrees to proceed..  My office door was closed. No one else was in the room.  She acknowledged consent and understanding of privacy and security of the video platform. The patient has agreed to participate and understands they can discontinue the visit at any time.    Patient is aware this is a billable service.     Subjective  Shadiakenia Dalton is a 35 y.o.  female     medication management for anxiety-taking Zoloft 25 mg daily and Xanax prn. Doing well on medications. No problems/concerns at present .           Past Medical History:   Diagnosis Date    Anxiety        Past Surgical History:   Procedure Laterality Date     SECTION  2023    WISDOM TOOTH EXTRACTION         Current Outpatient Medications   Medication Sig Dispense Refill    ALPRAZolam (XANAX) 0.5 mg tablet Take 1 tablet (0.5 mg total) by mouth 2 (two) times a day as needed for anxiety 30 tablet 0    sertraline (ZOLOFT) 25 mg tablet Take 1 tablet (25 mg total) by mouth daily 90 tablet 1    SUMAtriptan (IMITREX) 25 mg tablet Take 1 tablet (25 mg total) by mouth once as needed for migraine for up to 60 doses 30 tablet 1     No current facility-administered medications for this visit.        Allergies   Allergen Reactions    Oxycodone-Acetaminophen     Vicodin [Hydrocodone-Acetaminophen] Rash       Review of Systems   Constitutional:  Negative for activity change.   Respiratory:  Negative for shortness of breath.    Neurological:  Negative for dizziness and headaches.   Psychiatric/Behavioral:  Negative for dysphoric mood. The patient is not nervous/anxious.        Video Exam    There were no vitals filed for this visit.    Physical Exam  Constitutional:       General: She is not in acute distress.     Appearance: Normal appearance. She is not ill-appearing.   HENT:      Head: Normocephalic.   Eyes:      Extraocular Movements: Extraocular movements intact.   Pulmonary:      Effort: Pulmonary effort is normal. No respiratory distress.   Skin:     Coloration: Skin is not pale.      Findings: No erythema.   Neurological:      Mental Status: She is alert and oriented to person, place, and time.   Psychiatric:         Mood and Affect: Mood normal.         Behavior: Behavior normal.         Thought Content: Thought content normal.         Judgment: Judgment normal.        PHQ-2/9 Depression Screening   well. The instrument and sponge counts were correct times two. The patient was awakened from general anesthesia and taken to the recovery room in a stable condition.         Specimens removed:   Endometrial curettings     Complications:   None     Condition:   stable     Disposition:   to PACU                Inez Mcclain MD  03/24/25  11:40 EDT       Little interest or pleasure in doing things: 0 - not at all  Feeling down, depressed, or hopeless: 0 - not at all  PHQ-2 Score: 0  PHQ-2 Interpretation: Negative depression screen         Visit Time  Total Visit Duration: 10

## 2025-03-24 NOTE — ANESTHESIA PREPROCEDURE EVALUATION
Anesthesia Evaluation     Patient summary reviewed and Nursing notes reviewed   NPO Solid Status: > 8 hours  NPO Liquid Status: > 4 hours           Airway   Mallampati: II  Neck ROM: full  No difficulty expected  Dental    (+) poor dentition    Pulmonary     breath sounds clear to auscultation  Cardiovascular     Rhythm: regular    (+) hypertension      Neuro/Psych  (+) psychiatric history Bipolar    ROS Comment: hx bells palsy  GI/Hepatic/Renal/Endo    (+) obesity, morbid obesity, diabetes mellitus    ROS Comment: BMI 46,4    Musculoskeletal     Abdominal   (+) obese   Substance History      OB/GYN          Other   blood dyscrasia anemia,                 Anesthesia Plan    ASA 3     general     intravenous induction     Anesthetic plan, risks, benefits, and alternatives have been provided, discussed and informed consent has been obtained with: patient.    CODE STATUS:

## 2025-03-24 NOTE — ANESTHESIA PROCEDURE NOTES
Airway  Reason: elective    Date/Time: 3/24/2025 11:10 AM  Airway not difficult    General Information and Staff    Patient location during procedure: ICU  Anesthesiologist: Iva Belle MD  CRNA/CAA: Brice Joseph CRNA    Indications and Patient Condition  Indications for airway management: airway protection    Preoxygenated: yes    Mask difficulty assessment: 2 - vent by mask + OA or adjuvant +/- NMBA    Final Airway Details    Final airway type: endotracheal airway      Successful airway: ETT  Cuffed: yes   Successful intubation technique: direct laryngoscopy  Adjuncts used in placement: intubating stylet  Endotracheal tube insertion site: oral  Blade: Daisha  Blade size: 3  ETT size (mm): 7.5  Cormack-Lehane Classification: grade I - full view of glottis  Placement verified by: chest auscultation and capnometry   Inital cuff pressure (cm H2O): 20  Cuff volume (mL): 7  Measured from: lips  ETT/EBT  to lips (cm): 22  Number of attempts at approach: 1  Assessment: lips, teeth, and gum same as pre-op and atraumatic intubation    Additional Comments  Surgeon requesting steeper trendelenburg than anticipated; ETT placed; atraumatic

## 2025-03-25 LAB
CYTO UR: NORMAL
LAB AP CASE REPORT: NORMAL
PATH REPORT.FINAL DX SPEC: NORMAL
PATH REPORT.GROSS SPEC: NORMAL

## 2025-04-09 ENCOUNTER — OFFICE VISIT (OUTPATIENT)
Dept: OBSTETRICS AND GYNECOLOGY | Age: 45
End: 2025-04-09
Payer: MEDICAID

## 2025-04-09 VITALS
SYSTOLIC BLOOD PRESSURE: 124 MMHG | HEIGHT: 62 IN | WEIGHT: 275.8 LBS | BODY MASS INDEX: 50.75 KG/M2 | DIASTOLIC BLOOD PRESSURE: 80 MMHG

## 2025-04-09 DIAGNOSIS — Z09 POSTOPERATIVE FOLLOW-UP: Primary | ICD-10-CM

## 2025-04-09 NOTE — PROGRESS NOTES
[facility]   Obstetrics and Gynecology     2025      Patient:  Sarah Chan   MR#:[id number]    Office note    Chief Complaint   Patient presents with    Post-op     CC: 2 weeks post-op        Subjective     Post-op    44 y.o. female  presenting for post-op from hysteroscopy dilation and curettage with mirena IUD placement.       Relevant data reviewed: Tissue Pathology Exam (2025 11:24)       Patient Active Problem List   Diagnosis    Screening mammogram for breast cancer    Irregular menses    Anemia    Abnormal uterine bleeding (AUB)    Abnormal uterine bleeding       Past Medical History:   Diagnosis Date    Bipolar 1 disorder     Diabetes     TYPE 2    Encounter for blood transfusion     NO REACTION    History of Bell's palsy     Hypertension     Irregular menses      Past Surgical History:   Procedure Laterality Date    D & C HYSTEROSCOPY N/A 3/24/2025    Procedure: DILATATION AND CURETTAGE HYSTEROSCOPY and placement of intrauteerine device;  Surgeon: Inez Mcclain MD;  Location: [facility];  Service: Obstetrics/Gynecology;  Laterality: N/A;    TONSILLECTOMY       Obstetric History:  OB History          0    Para   0    Term   0       0    AB   0    Living   0         SAB   0    IAB   0    Ectopic   0    Molar   0    Multiple   0    Live Births   0               Menstrual History:     Patient's last menstrual period was 2025 (exact date).       The patient has never been pregnant.  Family History   Problem Relation Age of Onset    Stroke Father     Malig Hyperthermia Neg Hx      Social History     Tobacco Use    Smoking status: Never    Smokeless tobacco: Never   Vaping Use    Vaping status: Never Used   Substance Use Topics    Alcohol use: Yes     Comment: Very rarely    Drug use: Never     Patient has no known allergies.    Current Outpatient Medications:     cyanocobalamin (VITAMIN B-12) 500 MCG tablet, Take 2 tablets by mouth  Daily., Disp: , Rfl:     ferrous sulfate 325 (65 FE) MG tablet, Take 1 tablet by mouth Daily With Breakfast., Disp: 30 tablet, Rfl: 0    folic acid (FOLVITE) 1 MG tablet, Take 1 tablet by mouth Daily., Disp: , Rfl:     Levonorgestrel (Mirena, 52 MG,) 20 MCG/DAY intrauterine device IUD, To be inserted one time by prescriber. Route intrauterine., Disp: 1 each, Rfl: 0    meloxicam (MOBIC) 7.5 MG tablet, Take 1 tablet by mouth Daily., Disp: , Rfl:     ondansetron ODT (ZOFRAN-ODT) 4 MG disintegrating tablet, Take 1 tablet by mouth., Disp: , Rfl:     sertraline (ZOLOFT) 50 MG tablet, Take 1 tablet by mouth Daily., Disp: , Rfl:     traMADol (ULTRAM) 50 MG tablet, Take 1 tablet by mouth Every 6 (Six) Hours As Needed for Moderate Pain or Severe Pain., Disp: 5 tablet, Rfl: 0    famotidine (PEPCID) 20 MG tablet, Take 1 tablet by mouth As Needed. (Patient not taking: Reported on 4/9/2025), Disp: , Rfl:     hydrocortisone 1 % cream, Apply 1 Application topically to the appropriate area as directed 2 (Two) Times a Day. (Patient not taking: Reported on 4/9/2025), Disp: 28 g, Rfl: 0    lisinopril-hydrochlorothiazide (PRINZIDE,ZESTORETIC) 20-25 MG per tablet, Take 1 tablet by mouth Daily. DO NOT TAKE FOR 24 HOURS BEFORE SURGERY, Disp: , Rfl:     metFORMIN (GLUCOPHAGE) 1000 MG tablet, Take 1 tablet by mouth Daily With Breakfast., Disp: , Rfl:     Norethin-Eth Estrad-Fe Biphas (Lo Loestrin Fe) 1 MG-10 MCG / 10 MCG tablet, Take 1 tablet by mouth Daily. (Patient not taking: Reported on 4/9/2025), Disp: 28 tablet, Rfl: 3    nystatin (MYCOSTATIN) 388456 UNIT/GM cream, 1 Application As Needed. (Patient not taking: Reported on 4/9/2025), Disp: , Rfl:     triamcinolone (KENALOG) 0.1 % cream, Apply 1 Application topically to the appropriate area as directed As Needed. (Patient not taking: Reported on 4/9/2025), Disp: , Rfl:       Review of Systems   All other systems reviewed and are negative.      BP Readings from Last 3 Encounters:  "  25 124/80   25 105/49   25 143/92      Wt Readings from Last 3 Encounters:   25 125 kg (275 lb 12.8 oz)   25 121 kg (266 lb 12.1 oz)   25 121 kg (266 lb)      BMI: Estimated body mass index is 50.75 kg/m² as calculated from the following:    Height as of this encounter: 157 cm (61.81\").    Weight as of this encounter: 125 kg (275 lb 12.8 oz). BSA: Estimated body surface area is 2.19 meters squared as calculated from the following:    Height as of this encounter: 157 cm (61.81\").    Weight as of this encounter: 125 kg (275 lb 12.8 oz).    Objective   Physical Exam  Vitals and nursing note reviewed.   Constitutional:       Appearance: Normal appearance.   HENT:      Head: Normocephalic and atraumatic.   Pulmonary:      Effort: Pulmonary effort is normal.   Neurological:      Mental Status: She is alert and oriented to person, place, and time.   Psychiatric:         Mood and Affect: Mood normal.         Assessment & Plan   44 y.o. female  presenting for post-op from hysteroscopy dilation and curettage with mirena IUD placement.     Diagnoses and all orders for this visit:    1. Postoperative follow-up (Primary)  Comments:  - Doing well, bleeding has decreased.   - REviewed expectations for Mirena.   - Reviewed benign path.   - RTC 1 year for annual.        Return in about 1 year (around 2026).    Inez Mcclain MD   2025 12:45 EDT    "

## 2025-05-21 ENCOUNTER — OFFICE VISIT (OUTPATIENT)
Age: 45
End: 2025-05-21
Payer: MEDICAID

## 2025-05-21 VITALS
BODY MASS INDEX: 50.4 KG/M2 | HEIGHT: 62 IN | WEIGHT: 273.9 LBS | SYSTOLIC BLOOD PRESSURE: 148 MMHG | OXYGEN SATURATION: 98 % | DIASTOLIC BLOOD PRESSURE: 93 MMHG | HEART RATE: 83 BPM

## 2025-05-21 DIAGNOSIS — F41.0 GENERALIZED ANXIETY DISORDER WITH PANIC ATTACKS: ICD-10-CM

## 2025-05-21 DIAGNOSIS — F41.1 GENERALIZED ANXIETY DISORDER WITH PANIC ATTACKS: ICD-10-CM

## 2025-05-21 DIAGNOSIS — F33.3 SEVERE EPISODE OF RECURRENT MAJOR DEPRESSIVE DISORDER, WITH PSYCHOTIC FEATURES: Primary | ICD-10-CM

## 2025-05-21 RX ORDER — LURASIDONE HYDROCHLORIDE 20 MG/1
20 TABLET, FILM COATED ORAL DAILY
Qty: 30 TABLET | Refills: 1 | Status: SHIPPED | OUTPATIENT
Start: 2025-05-21 | End: 2025-06-23 | Stop reason: DRUGHIGH

## 2025-05-21 RX ORDER — SEMAGLUTIDE 0.68 MG/ML
0.5 INJECTION, SOLUTION SUBCUTANEOUS
COMMUNITY
Start: 2025-05-19

## 2025-05-21 RX ORDER — LOVASTATIN 10 MG/1
10 TABLET ORAL DAILY
COMMUNITY
Start: 2025-05-12

## 2025-05-21 NOTE — PROGRESS NOTES
"Chief Complaint: \"anxiety and depression\"     Marivel Chan presents to [facility] for a mental health evaluation.     HPI :     Pt is a 44 y.o. yo female who is being seen here at the clinic for a mental health health evaluation. Reports that she has been struggling with anxiety and depression since childhood. Started taking medication for depression and anxiety about 7-8 years ago. States she has only tried Zoloft has only gone up to 100mg daily. Pt is currently taking Zoloft 50 BID for about 2 years now. States she has not seen any benefit with the Zoloft. States she also struggles with irritability. Pt's primary complaints today are anxiety, depression, and irritability. Pt did come with her friend. Friend states that pt will chew toilet paper because she gets so anxious.     Reports the following symptoms of depression: depressed mood, diminished interest or pleasure in activities, decreased appetite, poor sleep, psychomotor agitation, fatigue or loss of energy, feelings of worthlessness, inappropriate guilt , diminished ability to concentrate, and recurrent thoughts of death. States she lacks energy to do anything and lays in bed all day. Reports occasional thoughts of death but denies any intent or plan to harm herself.  Reports to feel depressed nearly every day.    Reports the following symptoms of anxiety: Excessive anxiety and worry, Difficulty controlling the worry, restlessness, easily fatigued, difficulty concentrating , mind going blank, irritability, muscle tension, and sleep disturbance. Reports to get so anxious at times she develops panic attacks. Reports the following symptoms during her panic attacks: Palpitations, Accelerated heart rate, Sweating, Trembling or shaking, Sensations of shortness of breath or smothering, and Fear of \"going crazy\" or losing control. States putting vicks vapor rub on her nose is helpful during her panic " attacks. States panic attacks occur daily.     Reports that she becomes irritable easily. States she will yell and become impatient. Denies ever getting physically violent.     Reports to be sleeping about 4 hours each night. States she is in bed most of the day.  Patient reports that she had times of seeing the [place] Kristen and does hear her father's voice at times.  Patient's friend who she lives with stated today during appointment that she often hears patient talking to herself in her room but the patient states that she is praying.    Discussed in great detail with patient's friend and patient when a hypomanic/manic episode looks like.  Patient's friend states that she has episodes that occur for 2 to 3 days where she has very odd random interest such as music.  Patient's friend stated that last month she went 2 to 3 days only listening to rock music without having any prior interest in it before.  Friend states that patient would talk aggressively about rock music but the interest goes away after 2-3 days.     Psychiatric Review of Systems:   Depression: depressed mood, diminished interest or pleasure in activities, decreased appetite, poor sleep, psychomotor agitation, fatigue or loss of energy, feelings of worthlessness, inappropriate guilt , diminished ability to concentrate, and recurrent thoughts of death   Megan: Denies current symptoms of megan.   Anxiety: Excessive anxiety and worry, Difficulty controlling the worry, restlessness, easily fatigued, difficulty concentrating , mind going blank, irritability, muscle tension, and sleep disturbance  Psychosis: Patient reports that she had times of seeing the [place] Kristen and does hear her father's voice at times.  Patient's friend who she lives with stated today during appointment that she often hears patient talking to herself in her room the patient states that she is praying.   Panic Attacks: Palpitations, Accelerated heart rate, Sweating, Trembling or  "shaking, Sensations of shortness of breath or smothering, and Fear of \"going crazy\" or losing control. Occur daily.    Agoraphobia: Denies symptoms of agoraphobia.   OCD: Denies symptoms of OCD.   Eating Disorders: Denies symptoms of an eating disorder.   PTSD: Denies symptoms of PTSD.  Specific Phobias: Denies any phobias.  Borderline Personality DO: Denies symptoms of borderline personality disorder.  Antisocial Personality DO: Denies symptoms of antisocial personality disorder.    Past Psychiatric History:   Diagnoses: Anxiety and Depression.   Hospitalizations: Denies.   Counseling: Denies.   Suicide attempts: Denies.   Self Harm: Denies.     Previous Psych Meds: Has been on Zoloft for the past 7-8 years without much improvement.     Substance Use/Abuse:   Caffeine: Denies.   Alcohol: Denies.   Tobacco: Denies.   Illicit substances: Denies.   IVDU: Denies.   History of formal substance abuse treatment: Denies.     Social History:    Family structure: Lives with friend. States all her family is from [facility].    Education: 10th grade.    Employment: Currently unemployed.    Supportive relationships: Friend Cheryl.    Restorationism/Donna: Hinduism.     Abuse History: Denies.     Traumatic Events: Denies.      Legal History:    Incarceration: Denies.    History of violence: Denies.      History: Denies.     Past Medical/Developmental History:    Chronic Illnesses: Listed below.    Head trauma: Denies.     Past Medical History:   Diagnosis Date    Bipolar 1 disorder     Diabetes     TYPE 2    Encounter for blood transfusion     NO REACTION    History of Bell's palsy 2023    Hypertension     Irregular menses       Family Psychiatric History:    Psychiatric history: Denies.     Current Medications:   Current Outpatient Medications   Medication Sig Dispense Refill    cyanocobalamin (VITAMIN B-12) 500 MCG tablet Take 2 tablets by mouth Daily.      ferrous sulfate 325 (65 FE) MG tablet Take 1 tablet by mouth Daily With " Breakfast. 30 tablet 0    folic acid (FOLVITE) 1 MG tablet Take 1 tablet by mouth Daily.      Levonorgestrel (Mirena, 52 MG,) 20 MCG/DAY intrauterine device IUD To be inserted one time by prescriber. Route intrauterine. 1 each 0    lovastatin (MEVACOR) 10 MG tablet Take 1 tablet by mouth Daily.      meloxicam (MOBIC) 7.5 MG tablet Take 1 tablet by mouth Daily.      metFORMIN (GLUCOPHAGE) 1000 MG tablet Take 1 tablet by mouth 2 (Two) Times a Day With Meals.      Ozempic, 0.25 or 0.5 MG/DOSE, 2 MG/3ML solution pen-injector Inject 0.5 mg under the skin into the appropriate area as directed Every 7 (Seven) Days.      sertraline (ZOLOFT) 50 MG tablet Take 1 tablet by mouth 2 (Two) Times a Day. Takes one in am and one in pm      traMADol (ULTRAM) 50 MG tablet Take 1 tablet by mouth Every 6 (Six) Hours As Needed for Moderate Pain or Severe Pain. 5 tablet 0    famotidine (PEPCID) 20 MG tablet Take 1 tablet by mouth As Needed. (Patient not taking: Reported on 5/21/2025)      hydrocortisone 1 % cream Apply 1 Application topically to the appropriate area as directed 2 (Two) Times a Day. (Patient not taking: Reported on 5/21/2025) 28 g 0    lisinopril-hydrochlorothiazide (PRINZIDE,ZESTORETIC) 20-25 MG per tablet Take 1 tablet by mouth Daily. DO NOT TAKE FOR 24 HOURS BEFORE SURGERY      Lurasidone HCl (Latuda) 20 MG tablet tablet Take 1 tablet by mouth Daily for 60 days. Take with a meal. 30 tablet 1    metFORMIN (GLUCOPHAGE) 1000 MG tablet Take 1 tablet by mouth Daily With Breakfast.      Norethin-Eth Estrad-Fe Biphas (Lo Loestrin Fe) 1 MG-10 MCG / 10 MCG tablet Take 1 tablet by mouth Daily. (Patient not taking: Reported on 5/21/2025) 28 tablet 3    nystatin (MYCOSTATIN) 811623 UNIT/GM cream 1 Application As Needed. (Patient not taking: Reported on 5/21/2025)      ondansetron ODT (ZOFRAN-ODT) 4 MG disintegrating tablet Take 1 tablet by mouth. (Patient not taking: Reported on 5/21/2025)      triamcinolone (KENALOG) 0.1 % cream  "Apply 1 Application topically to the appropriate area as directed As Needed. (Patient not taking: Reported on 5/21/2025)       No current facility-administered medications for this visit.     Review of Systems   Constitutional:  Negative for appetite change.   HENT:  Negative for sore throat.    Eyes:  Negative for visual disturbance.   Respiratory:  Negative for chest tightness and shortness of breath.    Cardiovascular:  Negative for chest pain and palpitations.   Gastrointestinal:  Negative for abdominal pain, constipation, diarrhea and nausea.   Genitourinary:  Negative for difficulty urinating.   Neurological:  Negative for dizziness, tremors and memory problem.   Psychiatric/Behavioral:  Positive for hallucinations, sleep disturbance and depressed mood. Negative for suicidal ideas. The patient is nervous/anxious.         Mental Status Exam:   MENTAL STATUS EXAM   General Appearance:  Cleanly groomed and dressed  Eye Contact:  Good eye contact  Attitude:  Cooperative  Motor Activity:  Normal gait, posture  Muscle Strength:  Normal  Speech:  Normal rate, tone, volume  Language:  Spontaneous  Mood and affect:  Anxious  Hopelessness:  Denies  Loneliness: Denies  Thought Process:  Logical  Associations/ Thought Content:  No delusions  Hallucinations:  None  Suicidal Ideations:  Not present  Homicidal Ideation:  Not present  Sensorium:  Alert and clear  Orientation:  Person, place, time and situation  Attention Span/ Concentration:  Good  Fund of Knowledge:  Appropriate for age and educational level  Intellectual Functioning:  Average range  Insight:  Good  Judgement:  Good  Reliability:  Good  Impulse Control:  Good       Objective   Vital Signs:   /93   Pulse 83   Ht 157 cm (61.81\")   Wt 124 kg (273 lb 14.4 oz)   SpO2 98%   BMI 50.40 kg/m²       Result Review :       TSH          11/7/2024    13:08   TSH   TSH 1.450                Assessment and Plan      PHQ-9 Score:   PHQ-9 Total Score: 25    PHQ-9 " Depression Screening  Little interest or pleasure in doing things? Almost all   Feeling down, depressed, or hopeless? Almost all   PHQ-2 Total Score 6   Trouble falling or staying asleep, or sleeping too much? Almost all   Feeling tired or having little energy? Almost all   Poor appetite or overeating? Almost all   Feeling bad about yourself - or that you are a failure or have let yourself or your family down? Almost all   Trouble concentrating on things, such as reading the newspaper or watching television? Almost all   Moving or speaking so slowly that other people could have noticed? Or the opposite - being so fidgety or restless that you have been moving around a lot more than usual? Almost all   Thoughts that you would be better off dead, or of hurting yourself in some way? Several days   PHQ-9 Total Score 25   If you checked off any problems, how difficult have these problems made it for you to do your work, take care of things at home, or get along with other people? Extremely difficult     Depression Screening:  Patient screened positive for depression based on a PHQ-9 score of 25 on 5/21/2025. Follow-up recommendations include: Prescribed antidepressant medication treatment.    VÍCTOR-7      Over the last two weeks, how often have you been bothered by the following problems?  Feeling nervous, anxious or on edge: Nearly every day  Not being able to stop or control worrying: Nearly every day  Worrying too much about different things: Nearly every day  Trouble Relaxing: Nearly every day  Being so restless that it is hard to sit still: Nearly every day  Becoming easily annoyed or irritable: Nearly every day  Feeling afraid as if something awful might happen: More than half the days  VÍCTOR 7 Total Score: 20  If you checked any problems, how difficult have these problems made it for you to do your work, take care of things at home, or get along with other people: Extremely difficult            [place] Suicide  Severity Rating Scale (Screener/Recent Self-Report)  1. Wish to be Dead (Past 1 Month): No  2. Non-Specific Active Suicidal Thoughts (Past 1 Month): No  6. Suicidal Behavior (Lifetime): No  Calculated C-SSRS Risk Score (Lifetime/Recent): No Risk Indicated    Tobacco Cessation:  N/A.     Impression/Treatment Plan:  Patient is a 44-year-old female. Reports that she has been struggling with anxiety and depression since childhood. Started taking medication for depression and anxiety about 7-8 years ago. States she has only tried Zoloft has only gone up to 100mg daily. Pt is currently taking Zoloft 50 BID for about 2 years now. States she has not seen any benefit with the Zoloft. States she also struggles with irritability. Pt's primary complaints today are anxiety, depression, and irritability. Pt did come with her friend. Friend states that pt will chew toilet paper because she gets so anxious.  Patient also reported that she had times of seeing the [place] Kristen and does hear her father's voice at times.  Patient's friend who she lives with stated today during appointment that she often hears patient talking to herself in her room but the patient states that she is praying. Discussed in great detail with patient's friend and patient when a hypomanic/manic episode looks like.  Patient's friend states that she has episodes that occur for 2 to 3 days where she has very odd random interest such as music.  Patient's friend stated that last month she went 2 to 3 days only listening to rock music without having any prior interest in it before.  Friend states that patient would talk aggressively about rock music but the interest goes away after 2-3 days.  After interviewing patient today, patient's symptoms do seem to align with MDD with possible psychotic features and VÍCTOR with panic attacks.  Will keep bipolar 2 disorder as a differential.  Patient's episodes of hypomanic like symptoms technically do not meet criteria for a  hypomanic episode keep bipolar 2 as a differential.  Will continue Zoloft 100 mg daily and add Latuda 20 mg nightly to treat patient's anxiety and depressive symptoms.  Discussed possible antipsychotic options with patient and dose chosen because it is more metabolic friendly and patient is diabetic.  Patient plan created with patient and can be found below.  Reviewed patient's last lipid panel and A1c that was completed in April 2025.  A1c and lipid panel were both elevated.    Short-term goals: Develop rapport with patient.    Long-term goals: Symptom reduction with medication and therapy.    Weakness: Family currently lives far from her.    Strengths: Great support from friend.    Diagnoses and all orders for this visit:    1. Severe episode of recurrent major depressive disorder, with psychotic features (Primary)    2. Generalized anxiety disorder with panic attacks    Other orders  -     Lurasidone HCl (Latuda) 20 MG tablet tablet; Take 1 tablet by mouth Daily for 60 days. Take with a meal.  Dispense: 30 tablet; Refill: 1      MEDS ORDERED DURING VISIT:  New Medications Ordered This Visit   Medications    Lurasidone HCl (Latuda) 20 MG tablet tablet     Sig: Take 1 tablet by mouth Daily for 60 days. Take with a meal.     Dispense:  30 tablet     Refill:  1       Follow Up   Return in about 4 weeks (around 6/18/2025) for Recheck.  Patient was given instructions and counseling regarding her condition or for health maintenance advice. Please see specific information pulled into the AVS if appropriate.     PATIENT EDUCATION:  - Discussed medication options and treatment plan of prescribed medication as well as the risks, benefits, and side effects   - Encouraged pt to continue supportive psychotherapy efforts and medications as indicated.  - Educated pt on signs and symptoms of serotonin syndrome and notified pt to go to the ER if experiencing these symptoms.   - Notified pt that antidepressants can sometimes cause  worsening SI and to monitor for this.   - Notified pt that antipsychotics can increase cholesterol levels, blood sugar, wt, and BP.   - Educated pt on EPS/TD and to notify provider is they experience these symptoms.     Step 1: Warning signs:  Warning Signs   Isolate.   lying in bed.      Step 2: Internal coping strategies - Things I can do to take my mind off my problems without contacting another person:  Strategies   Praying      Step 3: People and social settings that provide distraction:  Name Contact Information   Friend - Maryann in pt's phone         Step 4: People whom I can ask for help during a crisis:  Name Contact Information   Friend - Maryann in pt's phone      Step 5: Professionals or agencies I can contact during a crisis:  Clinician/Agency Name Phone Emergency Contact   [facility] [phone]    [facility] Department Emergency Department Address Emergency Department Phone   [phone]        Suicide Prevention Lifeline Phone: Call or Text 988  Crisis Text Line: Text HOME to 180119     Step 6: Making the environment safer (plan for lethal means safety):  Did not identify any lethal methods     Optional: What is most important to me and worth living for?:  Her mother.    Treatment and medication options discussed during today's visit. Patient acknowledged and verbally consented to continue with current treatment plan and was educated on the importance of compliance with treatment and follow-up appointments. Patient is agreeable to call the office with any worsening of symptoms or onset of side effects. Patient is agreeable to call 911 or go to the nearest ER should he/she begin having SI/HI.    Constantin reviewed and is appropriate.    SARAVANAN Whitlock, PMHNP-BC    Part of this note may be an electronic transcription/translation of spoken language to printed text using the Dragon Dictation System.

## 2025-05-23 ENCOUNTER — PATIENT ROUNDING (BHMG ONLY) (OUTPATIENT)
Age: 45
End: 2025-05-23
Payer: MEDICAID

## 2025-05-30 ENCOUNTER — TELEPHONE (OUTPATIENT)
Age: 45
End: 2025-05-30
Payer: MEDICAID

## 2025-05-30 DIAGNOSIS — F41.1 GENERALIZED ANXIETY DISORDER: Primary | ICD-10-CM

## 2025-06-01 ENCOUNTER — APPOINTMENT (OUTPATIENT)
Dept: GENERAL RADIOLOGY | Facility: HOSPITAL | Age: 45
End: 2025-06-01
Payer: MEDICAID

## 2025-06-01 ENCOUNTER — HOSPITAL ENCOUNTER (EMERGENCY)
Facility: HOSPITAL | Age: 45
Discharge: HOME | End: 2025-06-01
Attending: EMERGENCY MEDICINE | Admitting: EMERGENCY MEDICINE
Payer: MEDICAID

## 2025-06-01 ENCOUNTER — APPOINTMENT (OUTPATIENT)
Dept: CARDIOLOGY | Facility: HOSPITAL | Age: 45
End: 2025-06-01
Payer: MEDICAID

## 2025-06-01 VITALS
DIASTOLIC BLOOD PRESSURE: 93 MMHG | OXYGEN SATURATION: 97 % | HEART RATE: 75 BPM | SYSTOLIC BLOOD PRESSURE: 134 MMHG | RESPIRATION RATE: 16 BRPM | TEMPERATURE: 98.6 F

## 2025-06-01 DIAGNOSIS — M25.561 ACUTE PAIN OF RIGHT KNEE: Primary | ICD-10-CM

## 2025-06-01 LAB
BH CV LOWER VASCULAR LEFT COMMON FEMORAL AUGMENT: NORMAL
BH CV LOWER VASCULAR LEFT COMMON FEMORAL COMPETENT: NORMAL
BH CV LOWER VASCULAR LEFT COMMON FEMORAL COMPRESS: NORMAL
BH CV LOWER VASCULAR LEFT COMMON FEMORAL PHASIC: NORMAL
BH CV LOWER VASCULAR LEFT COMMON FEMORAL SPONT: NORMAL
BH CV LOWER VASCULAR RIGHT COMMON FEMORAL AUGMENT: NORMAL
BH CV LOWER VASCULAR RIGHT COMMON FEMORAL COMPETENT: NORMAL
BH CV LOWER VASCULAR RIGHT COMMON FEMORAL COMPRESS: NORMAL
BH CV LOWER VASCULAR RIGHT COMMON FEMORAL PHASIC: NORMAL
BH CV LOWER VASCULAR RIGHT COMMON FEMORAL SPONT: NORMAL
BH CV LOWER VASCULAR RIGHT DISTAL FEMORAL COMPRESS: NORMAL
BH CV LOWER VASCULAR RIGHT GASTRONEMIUS COMPRESS: NORMAL
BH CV LOWER VASCULAR RIGHT GREATER SAPH AK COMPRESS: NORMAL
BH CV LOWER VASCULAR RIGHT GREATER SAPH BK COMPRESS: NORMAL
BH CV LOWER VASCULAR RIGHT LESSER SAPH COMPRESS: NORMAL
BH CV LOWER VASCULAR RIGHT MID FEMORAL AUGMENT: NORMAL
BH CV LOWER VASCULAR RIGHT MID FEMORAL COMPETENT: NORMAL
BH CV LOWER VASCULAR RIGHT MID FEMORAL COMPRESS: NORMAL
BH CV LOWER VASCULAR RIGHT MID FEMORAL PHASIC: NORMAL
BH CV LOWER VASCULAR RIGHT MID FEMORAL SPONT: NORMAL
BH CV LOWER VASCULAR RIGHT PERONEAL COMPRESS: NORMAL
BH CV LOWER VASCULAR RIGHT POPLITEAL AUGMENT: NORMAL
BH CV LOWER VASCULAR RIGHT POPLITEAL COMPETENT: NORMAL
BH CV LOWER VASCULAR RIGHT POPLITEAL COMPRESS: NORMAL
BH CV LOWER VASCULAR RIGHT POPLITEAL PHASIC: NORMAL
BH CV LOWER VASCULAR RIGHT POPLITEAL SPONT: NORMAL
BH CV LOWER VASCULAR RIGHT POSTERIOR TIBIAL COMPRESS: NORMAL
BH CV LOWER VASCULAR RIGHT PROFUNDA FEMORAL COMPRESS: NORMAL
BH CV LOWER VASCULAR RIGHT PROXIMAL FEMORAL COMPRESS: NORMAL
BH CV LOWER VASCULAR RIGHT SAPHENOFEMORAL JUNCTION COMPRESS: NORMAL

## 2025-06-01 PROCEDURE — 93971 EXTREMITY STUDY: CPT | Performed by: SURGERY

## 2025-06-01 PROCEDURE — 73562 X-RAY EXAM OF KNEE 3: CPT

## 2025-06-01 PROCEDURE — 99284 EMERGENCY DEPT VISIT MOD MDM: CPT

## 2025-06-01 PROCEDURE — 93971 EXTREMITY STUDY: CPT

## 2025-06-01 RX ORDER — ACETAMINOPHEN 500 MG
1000 TABLET ORAL ONCE
Status: COMPLETED | OUTPATIENT
Start: 2025-06-01 | End: 2025-06-01

## 2025-06-01 RX ADMIN — ACETAMINOPHEN 1000 MG: 500 TABLET, FILM COATED ORAL at 13:17

## 2025-06-01 NOTE — DISCHARGE INSTRUCTIONS
As we discussed, you can use Tylenol and Motrin for pain.  Use knee immobilizer to help stabilize knee.  No prolonged standing or walking.  Return if you have worsening of pain, fever, rash, any other concerns.

## 2025-06-01 NOTE — ED NOTES
"Patient c/o limited right knee movement since hearing a \"pop\" when she sat down yesterday. Patient states she is able to bear weight on the leg - she just has minimal ROM in her right knee.   "

## 2025-06-01 NOTE — ED PROVIDER NOTES
EMERGENCY DEPARTMENT ENCOUNTER    Room Number:  39/39  Date of encounter:  6/1/2025  PCP: Guille Stringer NP  Historian: Patient and sister  Relevant information and history provided by sources other than the patient will be included below and in the ED Course.  Review of pertinent past medical records may also be included in record below and ED Course.    HPI:  Chief Complaint: Right knee pain and right leg pain  A complete HPI/ROS/PMH/PSH/SH/FH are unobtainable due to: Not applicable  Context: Sarah Chan is a 44 y.o. female who presents to the ED c/o patient for the past week has had a little discomfort in her right knee it is diffuse in the right knee mainly in the popliteal region in the upper portion of her calf.  She feels like there is been a little clinical popping at times.  And then yesterday she went to sit down to get her haircut and felt a pop in her right knee and the pain that she has had is worse.  It is in a similar location as what she has had the past week mainly in the popliteal region diffuse aspect of her knee in the upper portion of her calf.  She has never had a DVT before.  When she gets up and ambulates she noticed some discomfort she especially notices discomfort when she externally rotates at her hip and then brings her foot across to her left lower extremity.  She denies any swelling to her lower extremities.  Denies chest pain or shortness of breath.  Has never had problems with that right knee before.  No new focal weakness numbness or tingling.        Previous Episodes: No  Current Symptoms: She has the pain when she tries to stand and ambulate and when she manipulates the knee as mentioned above.    MEDICAL HISTORY REVIEWED  Patient is morbidly obese.  Has a history of bipolar disorder and hypertension and diabetes.  I did review her medicine list.      PAST MEDICAL HISTORY  Active Ambulatory Problems     Diagnosis Date Noted    Screening mammogram for breast cancer  09/12/2024    Irregular menses 09/12/2024    Anemia 11/07/2024    Abnormal uterine bleeding (AUB) 12/12/2024    Abnormal uterine bleeding 02/06/2025     Resolved Ambulatory Problems     Diagnosis Date Noted    No Resolved Ambulatory Problems     Past Medical History:   Diagnosis Date    Bipolar 1 disorder     Diabetes     Encounter for blood transfusion     History of Bell's palsy 2023    Hypertension          PAST SURGICAL HISTORY  Past Surgical History:   Procedure Laterality Date    D & C HYSTEROSCOPY N/A 3/24/2025    Procedure: DILATATION AND CURETTAGE HYSTEROSCOPY and placement of intrauteerine device;  Surgeon: Inez Mcclain MD;  Location: [facility] MAIN OR;  Service: Obstetrics/Gynecology;  Laterality: N/A;    TONSILLECTOMY           FAMILY HISTORY  Family History   Problem Relation Age of Onset    Stroke Father     Malig Hyperthermia Neg Hx          SOCIAL HISTORY  Social History     Socioeconomic History    Marital status:    Tobacco Use    Smoking status: Never    Smokeless tobacco: Never   Vaping Use    Vaping status: Never Used   Substance and Sexual Activity    Alcohol use: Yes     Comment: Very rarely    Drug use: Never    Sexual activity: Yes     Partners: Male         ALLERGIES  Patient has no known allergies.        REVIEW OF SYSTEMS  Review of Systems     All systems reviewed and negative except for those discussed in HPI.       PHYSICAL EXAM    I have reviewed the triage vital signs and nursing notes.    ED Triage Vitals   Temp Heart Rate Resp BP SpO2   06/01/25 1201 06/01/25 1201 06/01/25 1201 06/01/25 1216 06/01/25 1201   98.6 °F (37 °C) 81 18 147/81 97 %      Temp src Heart Rate Source Patient Position BP Location FiO2 (%)   -- -- -- -- --              GENERAL:  no acute distress.Vital signs on my initial evaluation been reviewed and unremarkable  HENT: nares patent  Head/neck/ face are symmetric without gross deformity, signs of trauma, or swelling  EYES: no scleral icterus, no  conjunctival pallor.  NECK: Supple, no meningismus  CV: regular rhythm, regular rate with intact distal pulses.  RESPIRATORY: normal effort and no respiratory distress  ABDOMEN: soft and nontender.  Obese  MUSCULOSKELETAL: No obvious swelling to lower extremities they appear symmetric.  No erythema no coolness has intact distal pulses that are equal strong and symmetric.  She has good flexion extension in the knee without pain.  But when I externally rotate the hip and apply medial to lateral pressure on the knee she does come some complain of some pain deep in the knee.  She also some pains of some pain in the popliteal region.  NEURO: alert and appropriate, moves all extremities, follows commands.  No focal motor or sensory changes  SKIN: warm, dry    Vital signs and nursing notes reviewed.        LAB RESULTS  Recent Results (from the past 24 hours)   Duplex Venous Lower Extremity - Right CAR    Collection Time: 06/01/25  2:31 PM   Result Value Ref Range    Right Common Femoral Spont Y     Right Common Femoral Competent Y     Right Common Femoral Phasic Y     Right Common Femoral Compress C     Right Common Femoral Augment Y     Right Saphenofemoral Junction Compress C     Right Profunda Femoral Compress C     Right Proximal Femoral Compress C     Right Mid Femoral Spont Y     Right Mid Femoral Competent Y     Right Mid Femoral Phasic Y     Right Mid Femoral Compress C     Right Mid Femoral Augment Y     Right Distal Femoral Compress C     Right Popliteal Spont Y     Right Popliteal Competent Y     Right Popliteal Phasic Y     Right Popliteal Compress C     Right Popliteal Augment Y     Right Posterior Tibial Compress C     Right Peroneal Compress C     Right Gastronemius Compress C     Right Greater Saph AK Compress C     Right Greater Saph BK Compress C     Right Lesser Saph Compress C     Left Common Femoral Spont Y     Left Common Femoral Competent Y     Left Common Femoral Phasic Y     Left Common Femoral  Compress C     Left Common Femoral Augment Y        Ordered the above labs and independently reviewed the results.        RADIOLOGY  Duplex Venous Lower Extremity - Right CAR  Result Date: 6/1/2025    Normal right lower extremity venous duplex scan.     XR Knee 3 View Right  Result Date: 6/1/2025  XR KNEE 3 VW RIGHT-  Portable evaluation  Clinical: Popliteal knee pain  FINDINGS: Mild medial compartment narrowing. Lateral compartment with preserved. There is spurring along the lateral compartments. There is patellofemoral joint space loss with spurring at this location. No joint effusion, bone lesion or acute osseous abnormality.  CONCLUSION: Degenerative change as described above.  This report was finalized on 6/1/2025 1:47 PM by Dr. Sim Thomas M.D on Workstation: [id number]        I ordered the above noted radiological studies. Reviewed by me and discussed with radiologist.  See dictation for official radiology interpretation.      PROCEDURES    Procedures      MEDICATIONS GIVEN IN ER    Medications   acetaminophen (TYLENOL) tablet 1,000 mg (1,000 mg Oral Given 6/1/25 1317)         All labs have been independently reviewed by me.  All radiology studies have been reviewed by me and I discussed with radiologist dictating the report when indicated below.  All EKG's independently viewed and interpreted by me.  Discussion below represents my analysis of pertinent findings related to patient's condition, differential diagnosis, treatment plan and final disposition.        PROGRESS, DATA ANALYSIS, CONSULTS, AND MEDICAL DECISION MAKING    I suspect that this is musculoskeletal in origin.  I am wondering if she has a meniscus tear in that right knee.  I am going to go ahead and check an x-ray I am going to do a venous Doppler on her as well.  I anticipate she will be able to be discharged home will have her follow-up with orthopedics.  Informed patient and sister at bedside of my clinical concerns and the test that we  will order.  All questions answered      ED Course as of 06/01/25 1909   Sun Jun 01, 2025   1417 Venous Doppler is negative for DVT. [MM]   1518 Informed patient as well as sister at bedside of the results of the Doppler and x-ray.  I anticipate that she likely has a meniscus tear.  Regardless she does not have any acute bony abnormality no fracture.  She has some degenerative changes.  Venous Doppler is negative for DVT and clinically I do not suspect that she has a DVT.  Patient's pain is improved with Tylenol.  In talking with the patient she would like to try a knee immobilizer.  Will also give her orthopedic surgery to follow-up with.  All questions answered [MM]      ED Course User Index  [MM] Ian Whitehead MD       AS OF 19:09 EDT VITALS:    BP - 134/93  HR - 75  TEMP - 98.6 °F (37 °C)  02 SATS - 97%    SOCIAL DETERMINANTS OF HEALTH THAT IMPACT OR LIMIT CARE (For example..Homelessness,safe discharge, inability to obtain care, follow up, or prescriptions):      DIAGNOSIS  Final diagnoses:   Acute pain of right knee         DISPOSITION  DISCHARGE    Patient discharged in stable condition.    Reviewed implications of results, diagnosis, meds, responsibility to follow up, warning signs and symptoms of possible worsening, potential complications and reasons to return to ER, including worsening of symptoms, any new rash, fever, worsening of pain, chest pain, shortness of breath, any concerns..    Patient/Family voiced understanding of above instructions.    Discussed plan for discharge, as there is no emergent indication for admission. Pt/family is agreeable and understands need for follow up and repeat testing.  Pt is aware that discharge does not mean that nothing is wrong but it indicates no emergency is present that requires admission and they must continue care with follow-up as given below or physician of their choice.     FOLLOW-UP  Kwabena Schneider II, MD  [address]  [facility]  [id number]  [phone]      Call tomorrow to arrange follow-up this week.  Keep knee immobilizer on knee.  No prolonged ambulation or standing.  Return if you have any worsening of pain, fever, any new chest pain, shortness of breath, or any other concerns.         Medication List      No changes were made to your prescriptions during this visit.                 DICTATED UTILIZING DRAGON DICTATION    Note Disclaimer: At [facility], we believe that sharing information builds trust and better relationships. You are receiving this note because you recently visited [facility]. It is possible you will see health information before a provider has talked with you about it. This kind of information can be easy to misunderstand. To help you fully understand what it means for your health, we urge you to discuss this note with your provider.       Ian Whitehead MD  06/01/25 1883

## 2025-06-10 ENCOUNTER — TRANSCRIBE ORDERS (OUTPATIENT)
Dept: PHYSICAL THERAPY | Facility: HOSPITAL | Age: 45
End: 2025-06-10
Payer: MEDICAID

## 2025-06-10 DIAGNOSIS — M25.561 RIGHT KNEE PAIN, UNSPECIFIED CHRONICITY: Primary | ICD-10-CM

## 2025-06-13 ENCOUNTER — HOSPITAL ENCOUNTER (OUTPATIENT)
Dept: PHYSICAL THERAPY | Facility: HOSPITAL | Age: 45
Setting detail: THERAPIES SERIES
Discharge: HOME | End: 2025-06-13
Payer: MEDICAID

## 2025-06-13 DIAGNOSIS — R26.89 IMPAIRED GAIT AND MOBILITY: ICD-10-CM

## 2025-06-13 DIAGNOSIS — Z74.09 IMPAIRED MOBILITY: ICD-10-CM

## 2025-06-13 DIAGNOSIS — M25.561 RIGHT KNEE PAIN, UNSPECIFIED CHRONICITY: Primary | ICD-10-CM

## 2025-06-13 PROCEDURE — 97162 PT EVAL MOD COMPLEX 30 MIN: CPT | Performed by: PHYSICAL THERAPIST

## 2025-06-13 PROCEDURE — 97110 THERAPEUTIC EXERCISES: CPT | Performed by: PHYSICAL THERAPIST

## 2025-06-13 NOTE — THERAPY EVALUATION
"  Outpatient Physical Therapy Ortho Initial Evaluation  [facility]     Patient Name: Sarah Chan  : 1980  MRN: [id number]  Today's Date: 2025      Visit Date: 2025    Patient Active Problem List   Diagnosis    Screening mammogram for breast cancer    Irregular menses    Anemia    Abnormal uterine bleeding (AUB)    Abnormal uterine bleeding        Past Medical History:   Diagnosis Date    Bipolar 1 disorder     Diabetes     TYPE 2    Encounter for blood transfusion     NO REACTION    History of Bell's palsy     Hypertension     Irregular menses         Past Surgical History:   Procedure Laterality Date    D & C HYSTEROSCOPY N/A 3/24/2025    Procedure: DILATATION AND CURETTAGE HYSTEROSCOPY and placement of intrauteerine device;  Surgeon: Inez Mcclain MD;  Location: [facility] MAIN OR;  Service: Obstetrics/Gynecology;  Laterality: N/A;    TONSILLECTOMY         Visit Dx:     ICD-10-CM ICD-9-CM   1. Right knee pain, unspecified chronicity  M25.561 719.46   2. Impaired mobility  Z74.09 799.89   3. Impaired gait and mobility  R26.89 781.2          Patient History       Row Name 25 0700             History    Chief Complaint Difficulty with daily activities;Difficulty Walking;Pain  -GJ      Type of Pain Knee pain  R  -GJ      Date Current Problem(s) Began --  2 weeks ago  -GJ      Brief Description of Current Complaint Ms. Chan is a 43 y/o female. She reports R knee pain. Went to ED on 2025 for R knee pain. (-) doppler for DVT. She reports 2 week hx of R knee pain, after hearing a pop in her R knee while sitting and adjusting position. She reports near immediate pain. She reports her knee gets stiff. She declined an injection at ortho MD.  X rays indicate degenerative changes.  Her condition is improving  with pain medication. Denies \"heat\" in the knee. (+) feelings of giving way of the knee. Denies catching. Pain location R anterior/posterior knee. Pain is intermittent. " Aggravating activities include walking/weight bearing.  Relieving activities include non weight bearing. Denies N/T. Sleep is not disturbed secondary to her knee. Previous treatments for this condition includes meds. Not currently working. She enjoys walking. She also reports R ankle pain.  -GJ      Previous treatment for THIS PROBLEM --  nno previous sessions.  -GJ      Patient/Caregiver Goals Relieve pain;Improve mobility;Improve strength;Know what to do to help the symptoms  -GJ      Occupation/sports/leisure activities not working  -GJ      What clinical tests have you had for this problem? X-ray  -GJ      Results of Clinical Tests degenerative changes  -GJ      Are you or can you be pregnant No  -GJ         Pain     Pain Location Knee  R  -GJ      What Performance Factors Make the Current Problem(s) WORSE? walking/weight bearing  -GJ      What Performance Factors Make the Current Problem(s) BETTER? non weight bearing  -GJ         Fall Risk Assessment    Any falls in the past year: No  -GJ         Daily Activities    Primary Language English  -GJ      Are you able to read Yes  -GJ      Are you able to write Yes  -GJ      How does patient learn best? Demonstration;Pictures/Video;Reading;Listening  -GJ      Teaching needs identified Home Exercise Program;Management of Condition  -GJ      Patient is concerned about/has problems with Climbing Stairs;Flexibility;Performing home management (household chores, shopping, care of dependents);Performing job responsibilities/community activities (work, school,;Standing;Transfers (getting out of a chair, bed);Walking;Performing sports, recreation, and play activities  -GJ      Barriers to learning None  -GJ      Pt Participated in POC and Goals Yes  -GJ                User Key  (r) = Recorded By, (t) = Taken By, (c) = Cosigned By      Initials Name Provider Type    GJ Maxime Koo, PT Physical Therapist                     PT Ortho       Row Name 06/13/25 0700        Posture/Observations    Alignment Options Genu valgus;Genu varus  -GJ    Genu valgus Bilateral:;Moderate  -GJ       Special Tests/Palpation    Special Tests/Palpation Knee  -GJ       Knee Palpation    Medial Joint Line Right:;Tender  -GJ       Patellar Accessory Motions    Patellar Accessory Motions Tested? Yes  -GJ    Superior glide Right:;WNL  -GJ    Inferior glide Right:;WNL  -GJ    Medial glide Right:;WNL  -GJ    Lateral glide Right:;WNL  -GJ       Knee Special Tests    Valgus stress (MCL lesion) Right:;Negative  -GJ    Varus stress (LCL lesion) Right:;Negative  -GJ    Thessaly test (meniscal lesion) Right:;Negative  -GJ    Petra’s sign (DVT) Bilateral:;Negative  -GJ       General ROM    RT Lower Ext Rt Knee Extension/Flexion  -GJ    LT Lower Ext Lt Knee Extension/Flexion  -GJ    GENERAL ROM COMMENTS bilateral hip flexion limited secondary to soft tissue apposition, bilateral ankle ROM symetrical and WFL, non contributory  -GJ       Right Lower Ext    Rt Knee Extension/Flexion AROM  seated  -GJ    Rt Knee Extension/Flexion PROM 2-130 (extension assessed in supine, flexion in seated)  -GJ       Left Lower Ext    Lt Knee Extension/Flexion AROM 2-115 seated  -GJ    Lt Knee Extension/Flexion PROM 0-125, (extension assessed in supine, flexion in seated)  -GJ       MMT (Manual Muscle Testing)    Rt Lower Ext Rt Hip Flexion;Rt Hip Extension;Rt Hip ABduction;Rt Knee Extension;Rt Knee Flexion;Rt Ankle Plantarflexion;Rt Ankle Dorsiflexion  -GJ    Lt Lower Ext Lt Hip Flexion;Lt Hip Extension;Lt Hip ABduction;Lt Knee Extension;Lt Knee Flexion;Lt Ankle Plantarflexion;Lt Ankle Dorsiflexion  -GJ       MMT Right Lower Ext    Rt Hip Flexion MMT, Gross Movement (4-/5) good minus  noted lateral trunk flexion indicating decreased core strength  -GJ    Rt Hip Extension MMT, Gross Movement (4/5) good  -GJ    Rt Hip ABduction MMT, Gross Movement (4/5) good  -GJ    Rt Knee Extension MMT, Gross Movement (4+/5) good plus  -GJ    Rt  Knee Flexion MMT, Gross Movement (4+/5) good plus  -GJ    Rt Ankle Plantarflexion MMT, Gross Movement (4-/5) good minus  -GJ    Rt Ankle Dorsiflexion MMT, Gross Movement (4+/5) good plus  -GJ       MMT Left Lower Ext    Lt Hip Flexion MMT, Gross Movement (4-/5) good minus  noted lateral trunk flexion indicating decreased core strength  -GJ    Lt Hip Extension MMT, Gross Movement (4/5) good  -GJ    Lt Hip ABduction MMT, Gross Movement (4/5) good  -GJ    Lt Knee Extension MMT, Gross Movement (4+/5) good plus  -GJ    Lt Knee Flexion MMT, Gross Movement (4+/5) good plus  -GJ    Lt Ankle Plantarflexion MMT, Gross Movement (4-/5) good minus  -GJ    Lt Ankle Dorsiflexion MMT, Gross Movement (4+/5) good plus  -GJ       Flexibility    Flexibility Tested? Lower Extremity  -GJ       Lower Extremity Flexibility    Hamstrings Bilateral:;Mildly limited  -GJ    Hip Flexors Bilateral:;Mildly limited  -GJ    Quadriceps Right:;Mildly limited  -GJ    Hip External Rotators Bilateral:;Mildly limited  -GJ       Balance Skills Training    SLS attempts, </= 2-3 s, noted significant R lateral sway during RLE SLS  -GJ       Gait/Stairs (Locomotion)    Comment, (Gait/Stairs) no AD, forward flexed, R>L valgus, increased lateral sway R>L, no AD  -GJ              User Key  (r) = Recorded By, (t) = Taken By, (c) = Cosigned By      Initials Name Provider Type    Maxime Brooks, PT Physical Therapist                                Therapy Education  Education Details: [id number], discussed dx, px, poc, discussed anatomy of the knee and physiology of healing, discussed realistic expectations and time frames for therapy. Discussed activity modification. encouraged use of ice, 1-2 x's per day 10-15 min, encouraged use of cane to decrese stress to tissues.  Given: HEP, Symptoms/condition management, Pain management, Posture/body mechanics, Mobility training, Edema management  Program: New  How Provided: Verbal, Demonstration, Written  Provided to:  Patient  Level of Understanding: Teach back education performed, Verbalized, Demonstrated      PT OP Goals       Row Name 06/13/25 0700          PT Short Term Goals    STG Date to Achieve 07/13/25  -GJ     STG 1 pt. to be I with initial HEP to facilitate self management of their condition  -GJ     STG 1 Progress New  -GJ     STG 2 pt. to be educated in/verbalize understanding of the importance of posture/ergonomics in association with their condition to facilitate self management of their condition  -GJ     STG 2 Progress New  -GJ     STG 3 pt. to ambulate with near normal heel to toe gait pattern with SC to facilitate ease/safety with community mobility  -GJ     STG 3 Progress New  -GJ     STG 4 --  -GJ     STG 4 Progress --  -GJ        Long Term Goals    LTG Date to Achieve 09/11/25  -GJ     LTG 1 pt. to be I with advanced HEP to facilitate self management of their condition  -GJ     LTG 1 Progress New  -GJ     LTG 2 pt to demonstrate Right knee AROM >/= 2-115 (seated) degrees to facilitate ease/safety with gait  -GJ     LTG 2 Progress New  -GJ     LTG 3 pt. to ascend/descends stairs with reciprocal pattern (</= 1 rails) to facilitate ease/safety of household/community mobility  -GJ     LTG 4 pt. to ambulate without AD with near normal heel to toe gait pattern to facilitate ease/safety of community mobility  -GJ     LTG 4 Progress New  -GJ     LTG 5 To demonstrate /perform greater than or equal to 10 repetitions of sit to stand during the 30 s sit to stand test demonstrating improved hip girdle and core strength allowing for greater ease and safety with household and community mobility  -GJ     LTG 5 Progress New  -        Time Calculation    PT Goal Re-Cert Due Date 09/11/25  -GJ               User Key  (r) = Recorded By, (t) = Taken By, (c) = Cosigned By      Initials Name Provider Type    Maxime Brooks, PT Physical Therapist                     PT Assessment/Plan       Row Name 06/13/25 0742          PT  Assessment    Functional Limitations Impaired gait;Limitation in home management;Limitations in community activities;Performance in leisure activities  -     Impairments Balance;Endurance;Gait;Impaired flexibility;Impaired muscle endurance;Impaired muscle length;Impaired muscle power;Impaired postural alignment;Muscle strength;Pain;Poor body mechanics;Posture  -GJ     Assessment Comments  is a 44-year-old female.  She reports right knee pain of 2-week duration.  She reports possible KYLEE was when she was sitting in adjusting her position she heard a pop in her right knee and felt immediate pain.  Ultimately she sought care in the ED on 6/1/2025.  Doppler exam negative for DVT.  She reports that her knee gets stiff.  She followed up with Ortho and declined an injection to her knee.  X-rays indicate degenerative changes of the knee.  Overall her condition is improving with the use of pain medication.  She denies that her knee gets hot.  She reports positive feeling of her right knee giving way however denies falls.  She denies catching of the knee.  Her pain is intermittent and activity dependent.  Aggravating activities include walking/weightbearing activities.  Relieving activities include nonweightbearing activities.  She denies numbness and tingling.  Sleep is not disturbed secondary to her knee pain.  She is not currently working.  She enjoys walking.  Of note she also reports right ankle pain of the more chronic nature. Ms. Chan presents to clinic ambulating without an assistive device with right greater than left genu valgus.  She demonstrates increased lateral sway bilaterally right greater than left.  She demonstrates limitations in right knee active range of motion.  She demonstrates decreased hip girdle core strength bilaterally.  She is tender to palpation along the medial aspect of the right knee joint line.  She demonstrates negative Homans bilaterally.  Ligamentous testing negative.   Negative testing for meniscus. Ms. Chan  demonstrates evolving s/s consistent with degenerative changes of her knee which limits her participation in household and community mobility.    Aggravating/Personal factors affecting recovery include,  but are not limited to, increased BMI, activity level.  Ms. Chan may benefit from skilled physical therapy to address the above impairments.  -     Please refer to paper survey for additional self-reported information No  -GJ     Rehab Potential Excellent  -GJ     Patient/caregiver participated in establishment of treatment plan and goals Yes  -GJ     Patient would benefit from skilled therapy intervention Yes  -GJ        PT Plan    PT Frequency 1x/week;2x/week  -GJ     Predicted Duration of Therapy Intervention (PT) 10 visits  -GJ     Planned CPT's? PT EVAL MOD COMPLELITY: 97006;PT RE-EVAL: 53954;PT THER PROC EA 15 MIN: 02674;PT THER ACT EA 15 MIN: 46548;PT MANUAL THERAPY EA 15 MIN: 62990;PT NEUROMUSC RE-EDUCATION EA 15 MIN: 54819;PT GAIT TRAINING EA 15 MIN: 11545;PT HOT OR COLD PACK TREAT MCARE;PT ELECTRICAL STIM UNATTEND:   -     PT Plan Comments warm up on nustep, STS, LAQ, seated HS curl, ? lateral stepping, ? SLS events  -               User Key  (r) = Recorded By, (t) = Taken By, (c) = Cosigned By      Initials Name Provider Type    Maxime Brooks, PT Physical Therapist                       OP Exercises       Row Name 06/13/25 0705 06/13/25 0700          Total Minutes    17845 - PT Therapeutic Exercise Minutes 9  -GJ --        Exercise 1    Exercise Name 1 -- nustep next session  -GJ        Exercise 2    Exercise Name 2 -- QS  -GJ     Cueing 2 -- Verbal;Demo  -GJ     Reps 2 -- 10  -GJ     Time 2 -- 5s  -GJ     Additional Comments -- towel under heel  -GJ        Exercise 3    Exercise Name 3 -- SLR  -GJ     Cueing 3 -- Verbal;Demo  -GJ     Reps 3 -- 5  -GJ     Additional Comments -- reset after each rep  -GJ        Exercise 4    Exercise Name 4 -- SL  clam, B  -GJ     Cueing 4 -- Verbal;Demo;Tactile  -GJ     Reps 4 -- 5  -GJ     Time 4 -- 3 s  -GJ        Exercise 5    Exercise Name 5 -- bridge  -GJ     Cueing 5 -- Verbal;Demo  -GJ     Reps 5 -- 5  -GJ     Time 5 -- 3s  -GJ        Exercise 6    Exercise Name 6 -- HR, standing  -GJ     Cueing 6 -- Verbal;Demo  -GJ     Reps 6 -- 5  -GJ               User Key  (r) = Recorded By, (t) = Taken By, (c) = Cosigned By      Initials Name Provider Type    Maxime Brooks, PT Physical Therapist                                  Outcome Measure Options: Knee Outcome Score- ADL, 30 Second Chair Stand Test  30 Second Chair Stand Test  30 Second Chair Stand Test: 7, with UE's  Knee Outcome Survey Activities of Daily Living Scale  Symptoms: Pain:  (not completed)      Time Calculation:     Start Time: 0705 (eval appt time 0700)  Stop Time: 0745  Time Calculation (min): 40 min  Timed Charges  47647 - PT Therapeutic Exercise Minutes: 9  Total Minutes  Timed Charges Total Minutes: 9   Total Minutes: 9     Therapy Charges for Today       Code Description Service Date Service Provider Modifiers Qty    61908631215 HC PT THER PROC EA 15 MIN 6/13/2025 Maxime Koo, PT GP 1    60157641871 HC PT EVAL MOD COMPLEXITY 3 6/13/2025 Maxime Koo, PT GP 1            PT G-Codes  Outcome Measure Options: Knee Outcome Score- ADL, 30 Second Chair Stand Test         Maxime Koo, ANTONIO  6/13/2025

## 2025-06-19 ENCOUNTER — HOSPITAL ENCOUNTER (OUTPATIENT)
Dept: PHYSICAL THERAPY | Facility: HOSPITAL | Age: 45
Setting detail: THERAPIES SERIES
Discharge: HOME | End: 2025-06-19
Payer: MEDICAID

## 2025-06-19 DIAGNOSIS — M25.561 RIGHT KNEE PAIN, UNSPECIFIED CHRONICITY: Primary | ICD-10-CM

## 2025-06-19 DIAGNOSIS — R26.89 IMPAIRED GAIT AND MOBILITY: ICD-10-CM

## 2025-06-19 DIAGNOSIS — Z74.09 IMPAIRED MOBILITY: ICD-10-CM

## 2025-06-19 PROCEDURE — 97110 THERAPEUTIC EXERCISES: CPT

## 2025-06-19 NOTE — THERAPY TREATMENT NOTE
Outpatient Physical Therapy Ortho Treatment Note  [facility]     Patient Name: Sarah Chan  : 1980  MRN: [id number]  Today's Date: 2025      Visit Date: 2025    Visit Dx:    ICD-10-CM ICD-9-CM   1. Right knee pain, unspecified chronicity  M25.561 719.46   2. Impaired gait and mobility  R26.89 781.2   3. Impaired mobility  Z74.09 799.89       Patient Active Problem List   Diagnosis    Screening mammogram for breast cancer    Irregular menses    Anemia    Abnormal uterine bleeding (AUB)    Abnormal uterine bleeding        Past Medical History:   Diagnosis Date    Bipolar 1 disorder     Diabetes     TYPE 2    Encounter for blood transfusion     NO REACTION    History of Bell's palsy     Hypertension     Irregular menses         Past Surgical History:   Procedure Laterality Date    D & C HYSTEROSCOPY N/A 3/24/2025    Procedure: DILATATION AND CURETTAGE HYSTEROSCOPY and placement of intrauteerine device;  Surgeon: Inez Mcclain MD;  Location: [facility] OR;  Service: Obstetrics/Gynecology;  Laterality: N/A;    TONSILLECTOMY                          PT Assessment/Plan       Row Name 25 1000          PT Assessment    Assessment Comments Ms. Chan returns to PT for first f/u since evaluation for R knee pain. Reports good compliance to HEP, as well as 8/10 pain upon arrival. Pt presents with questions re: knee braces or other options to potentially improve knee pain. Discussed voltaren gel for topical analgesic, as well as possibly investing in J-brace to feel more support to the knee joint. Began with nustep for a dynamic warm up to the LE, followed with review of initial HEP. Pt benefits from tactile cues to quad to ensure proper activation during QS vs involving glutes. Initiated further BLE strengthening exs today to include LAQ and seated HS curls. Increased sets/reps of HEP exs. Pt had mild pain throughout visit, but able to tolerate session appropriately. Ms.  Chan remains appropriate for skilled PT at this time.  -DR        PT Plan    PT Plan Comments assess pt tolerance to first full tx, update HEP if tolerated well. consider adding SLS vs kickstand activities, STS, side stepping?  -DR               User Key  (r) = Recorded By, (t) = Taken By, (c) = Cosigned By      Initials Name Provider Type    Gwyn Cain, PT Physical Therapist                       OP Exercises       Row Name 06/19/25 1000             Subjective    Subjective Comments Do you know if there is some type of brace I could put on to help with support? It just is painful.  -DR         Subjective Pain    Able to rate subjective pain? yes  -DR      Pre-Treatment Pain Level 7  -DR         Total Minutes    86268 - PT Therapeutic Exercise Minutes 43  -DR         Exercise 1    Exercise Name 1 nustep  -DR      Time 1 5 min, L2  -DR         Exercise 2    Exercise Name 2 QS  -DR      Cueing 2 Verbal;Demo  -DR      Reps 2 15  -DR      Time 2 5s  -DR      Additional Comments towel under knee  -DR         Exercise 3    Exercise Name 3 QS+SLR  -DR      Cueing 3 Verbal;Demo  -DR      Sets 3 2  -DR      Reps 3 10  -DR      Additional Comments reset after each rep  -DR         Exercise 4    Exercise Name 4 SL clam, B  -DR      Cueing 4 Verbal;Demo;Tactile  -DR      Sets 4 1e  -DR      Reps 4 15e  -DR      Time 4 3 s  -DR      Additional Comments bodyweight today  -DR         Exercise 5    Exercise Name 5 bridge  -DR      Cueing 5 Verbal;Demo  -DR      Sets 5 2  -DR      Reps 5 10  -DR      Time 5 3s  -DR         Exercise 6    Exercise Name 6 HR, standing  -DR      Cueing 6 Verbal;Demo  -DR      Sets 6 2  -DR      Reps 6 10  -DR         Exercise 7    Exercise Name 7 LAQ  -DR      Cueing 7 Verbal;Demo  -DR      Sets 7 2R  -DR      Reps 7 10  -DR         Exercise 8    Exercise Name 8 seated HS curls  -DR      Cueing 8 Verbal;Demo  -DR      Sets 8 2R  -DR      Reps 8 10  -DR      Time 8 RTB  -DR                User  Key  (r) = Recorded By, (t) = Taken By, (c) = Cosigned By      Initials Name Provider Type    Gwyn Cain, PT Physical Therapist                                  PT OP Goals       Row Name 06/19/25 1000          PT Short Term Goals    STG Date to Achieve 07/13/25  -DR     STG 1 pt. to be I with initial HEP to facilitate self management of their condition  -DR     STG 1 Progress Ongoing  -DR     STG 2 pt. to be educated in/verbalize understanding of the importance of posture/ergonomics in association with their condition to facilitate self management of their condition  -DR     STG 2 Progress Ongoing  -DR     STG 3 pt. to ambulate with near normal heel to toe gait pattern with SC to facilitate ease/safety with community mobility  -DR     STG 3 Progress Ongoing  -DR        Long Term Goals    LTG Date to Achieve 09/11/25  -DR     LTG 1 pt. to be I with advanced HEP to facilitate self management of their condition  -DR     LTG 1 Progress Ongoing  -DR     LTG 2 pt to demonstrate Right knee AROM >/= 2-115 (seated) degrees to facilitate ease/safety with gait  -DR     LTG 2 Progress Ongoing  -DR     LTG 3 pt. to ascend/descends stairs with reciprocal pattern (</= 1 rails) to facilitate ease/safety of household/community mobility  -DR     LTG 3 Progress Ongoing  -DR     LTG 4 pt. to ambulate without AD with near normal heel to toe gait pattern to facilitate ease/safety of community mobility  -DR     LTG 4 Progress Ongoing  -DR     LTG 5 To demonstrate /perform greater than or equal to 10 repetitions of sit to stand during the 30 s sit to stand test demonstrating improved hip girdle and core strength allowing for greater ease and safety with household and community mobility  -DR     LTG 5 Progress Ongoing  -DR               User Key  (r) = Recorded By, (t) = Taken By, (c) = Cosigned By      Initials Name Provider Type    Gwyn Cain, PT Physical Therapist                    Therapy Education  Education  Details: j-brace and voltaren gel for analgesic topical and brace for inc stability to knee joint  Given: Symptoms/condition management, Pain management  Program: New  How Provided: Verbal, Demonstration, Written  Provided to: Patient  Level of Understanding: Teach back education performed, Verbalized, Demonstrated              Time Calculation:   Start Time: 1018  Stop Time: 1101  Time Calculation (min): 43 min  Timed Charges  63687 - PT Therapeutic Exercise Minutes: 43  Total Minutes  Timed Charges Total Minutes: 43   Total Minutes: 43  Therapy Charges for Today       Code Description Service Date Service Provider Modifiers Qty    11824558067 HC PT THER PROC EA 15 MIN 6/19/2025 Gwyn Child, PT GP 3                      Gwyn Child, PT  6/19/2025

## 2025-06-23 ENCOUNTER — OFFICE VISIT (OUTPATIENT)
Age: 45
End: 2025-06-23
Payer: MEDICAID

## 2025-06-23 ENCOUNTER — APPOINTMENT (OUTPATIENT)
Dept: PHYSICAL THERAPY | Facility: HOSPITAL | Age: 45
End: 2025-06-23
Payer: MEDICAID

## 2025-06-23 VITALS
HEART RATE: 77 BPM | HEIGHT: 62 IN | BODY MASS INDEX: 51.23 KG/M2 | WEIGHT: 278.4 LBS | DIASTOLIC BLOOD PRESSURE: 68 MMHG | OXYGEN SATURATION: 98 % | SYSTOLIC BLOOD PRESSURE: 127 MMHG

## 2025-06-23 DIAGNOSIS — F41.1 GENERALIZED ANXIETY DISORDER: ICD-10-CM

## 2025-06-23 DIAGNOSIS — F33.3 SEVERE EPISODE OF RECURRENT MAJOR DEPRESSIVE DISORDER, WITH PSYCHOTIC FEATURES: Primary | ICD-10-CM

## 2025-06-23 PROCEDURE — 96127 BRIEF EMOTIONAL/BEHAV ASSMT: CPT

## 2025-06-23 PROCEDURE — 1160F RVW MEDS BY RX/DR IN RCRD: CPT

## 2025-06-23 PROCEDURE — 1159F MED LIST DOCD IN RCRD: CPT

## 2025-06-23 PROCEDURE — 99214 OFFICE O/P EST MOD 30 MIN: CPT

## 2025-06-23 RX ORDER — LURASIDONE HYDROCHLORIDE 40 MG/1
40 TABLET, FILM COATED ORAL DAILY
Qty: 30 TABLET | Refills: 1 | Status: SHIPPED | OUTPATIENT
Start: 2025-06-23 | End: 2025-08-22

## 2025-06-23 NOTE — PROGRESS NOTES
"     Office  Follow Up Visit      Patient Name: Sarah Chan  : 1980   MRN: [id number]     Referring Provider: Guille Stringer NP    Chief Complaint:  \"I don't feel any different\"     ICD-10-CM ICD-9-CM   1. Severe episode of recurrent major depressive disorder, with psychotic features  F33.3 296.34   2. Generalized anxiety disorder  F41.1 300.02      History of Present Illness:   Sarah Chan is a 44 y.o. female who is here today for follow up.  Patient was seen for initial evaluation on 2025.  During initial evaluation patient reported that she has been struggling with anxiety and depression since childhood. Started taking medication for depression and anxiety about 7-8 years ago. Stated she has only tried Zoloft but has only gone up to 100mg daily. Pt is currently taking Zoloft 50 BID for about 2 years now. States she has not seen any benefit with the Zoloft. States she also struggles with irritability. Pt's primary complaints during initial evaluation were anxiety, depression, and irritability. Pt did come to initial evaluation with her friend. Friend states that pt will chew toilet paper because she gets so anxious.  Patient did report some possible psychotic symptoms (hearing her father's voice and seeing the [employer]).  Patient's friend (who does live with patient) reported she often hears patient talking to herself in her room but the patient states that she is praying.  When provider questioned patient and her friend about symptoms of hypomania/polina, patient's friend stated that patient has had episodes that last 2 to 3 days where she has very odd interests such as rock music without having any prior interest before.  Patient's friend states that she would talk very aggressively about rock music and only listen to it for about 2 to 3 days.  Friend states that the patient will return back to normal after about 3 days.  After interviewing patient during initial evaluation, " discussed with patient that her symptoms do seem to align with MDD with possible psychotic features and VÍCTOR with panic attacks.  Discussed with patient that we would keep bipolar 2 disorder as a differential but currently her symptoms of hypomania technically do not meet criteria for a hypomanic episode but will keep with the diagnosis of bipolar 2 as a differential.  During initial evaluation patient was continued on Zoloft 100 mg daily and Latuda 20 mg daily was added to treat patient's anxiety and depressive symptoms.  Latuda was chosen because it is more metabolic friendly and patient is diabetic.  Patient's lipid panel and A1c was reviewed from April 2025.    Patient is currently taking Zoloft 100 mg daily and Latuda 20 mg daily. Pt reports that she has not seen any improvement in anxiety or depression. Feels anxious and depressed every day. Reports to be sleeping about 5 hours each night. Denies any side effects to her medications. Appetite is okay. Denies SI/HI. Pt reports to still hear her father voice and sees the virgin [employer].  Patient's friend states that she will see patient up late at night mumbling to herself. Denies any episodes of manic like symptoms.    Subjective      Review of Systems:   Review of Systems   Constitutional:  Negative for appetite change.   Respiratory:  Negative for chest tightness and shortness of breath.    Gastrointestinal:  Negative for abdominal pain, constipation, diarrhea, nausea and vomiting.   Genitourinary:  Negative for difficulty urinating.   Neurological:  Negative for headaches.   Psychiatric/Behavioral:  Positive for hallucinations. Negative for suicidal ideas. The patient is nervous/anxious.         Depressed.      PHQ-9 Depression Screening  Little interest or pleasure in doing things? Nearly every day   Feeling down, depressed, or hopeless? Nearly every day   PHQ-2 Total Score 6   Trouble falling or staying asleep, or sleeping too much? More than half the days    Feeling tired or having little energy? Nearly every day   Poor appetite or overeating? Nearly every day   Feeling bad about yourself - or that you are a failure or have let yourself or your family down? Nearly every day   Trouble concentrating on things, such as reading the newspaper or watching television? Nearly every day   Moving or speaking so slowly that other people could have noticed? Or the opposite - being so fidgety or restless that you have been moving around a lot more than usual? Nearly every day     Thoughts that you would be better off dead, or of hurting yourself in some way? Not at all   PHQ-9 Total Score 23   If you checked off any problems, how difficult have these problems made it for you to do your work, take care of things at home, or get along with other people? Extremely difficult       VÍCTOR-7      Over the last two weeks, how often have you been bothered by the following problems?  Feeling nervous, anxious or on edge: Nearly every day  Not being able to stop or control worrying: Nearly every day  Worrying too much about different things: Nearly every day  Trouble Relaxing: Nearly every day  Being so restless that it is hard to sit still: Nearly every day  Becoming easily annoyed or irritable: Nearly every day  Feeling afraid as if something awful might happen: Nearly every day  VÍCTOR 7 Total Score: 21  If you checked any problems, how difficult have these problems made it for you to do your work, take care of things at home, or get along with other people: Extremely difficult    Patient History:   The following portions of the patient's history were reviewed and updated as appropriate: allergies, current medications, past family history, past medical history, past social history, past surgical history and problem list.     Social History     Socioeconomic History    Marital status:    Tobacco Use    Smoking status: Never    Smokeless tobacco: Never   Vaping Use    Vaping status: Never  Used   Substance and Sexual Activity    Alcohol use: Yes     Comment: Very rarely    Drug use: Never    Sexual activity: Yes     Partners: Male     Medications:     Current Outpatient Medications:     cyanocobalamin (VITAMIN B-12) 500 MCG tablet, Take 2 tablets by mouth Daily., Disp: , Rfl:     famotidine (PEPCID) 20 MG tablet, Take 1 tablet by mouth As Needed., Disp: , Rfl:     ferrous sulfate 325 (65 FE) MG tablet, Take 1 tablet by mouth Daily With Breakfast., Disp: 30 tablet, Rfl: 0    folic acid (FOLVITE) 1 MG tablet, Take 1 tablet by mouth Daily., Disp: , Rfl:     hydrocortisone 1 % cream, Apply 1 Application topically to the appropriate area as directed 2 (Two) Times a Day., Disp: 28 g, Rfl: 0    Levonorgestrel (Mirena, 52 MG,) 20 MCG/DAY intrauterine device IUD, To be inserted one time by prescriber. Route intrauterine., Disp: 1 each, Rfl: 0    lovastatin (MEVACOR) 10 MG tablet, Take 1 tablet by mouth Daily., Disp: , Rfl:     meloxicam (MOBIC) 7.5 MG tablet, Take 1 tablet by mouth Daily., Disp: , Rfl:     Ozempic, 0.25 or 0.5 MG/DOSE, 2 MG/3ML solution pen-injector, Inject 0.5 mg under the skin into the appropriate area as directed Every 7 (Seven) Days., Disp: , Rfl:     sertraline (ZOLOFT) 50 MG tablet, Take 1 tablet by mouth 2 (Two) Times a Day. Takes one in am and one in pm, Disp: , Rfl:     traMADol (ULTRAM) 50 MG tablet, Take 1 tablet by mouth Every 6 (Six) Hours As Needed for Moderate Pain or Severe Pain., Disp: 5 tablet, Rfl: 0    lisinopril-hydrochlorothiazide (PRINZIDE,ZESTORETIC) 20-25 MG per tablet, Take 1 tablet by mouth Daily. DO NOT TAKE FOR 24 HOURS BEFORE SURGERY, Disp: , Rfl:     lurasidone (Latuda) 40 MG tablet tablet, Take 1 tablet by mouth Daily for 60 days., Disp: 30 tablet, Rfl: 1    metFORMIN (GLUCOPHAGE) 1000 MG tablet, Take 1 tablet by mouth Daily With Breakfast., Disp: , Rfl:     metFORMIN (GLUCOPHAGE) 1000 MG tablet, Take 1 tablet by mouth 2 (Two) Times a Day With Meals., Disp: ,  "Rfl:     Norethin-Eth Estrad-Fe Biphas (Lo Loestrin Fe) 1 MG-10 MCG / 10 MCG tablet, Take 1 tablet by mouth Daily. (Patient not taking: Reported on 4/9/2025), Disp: 28 tablet, Rfl: 3    nystatin (MYCOSTATIN) 766008 UNIT/GM cream, 1 Application As Needed. (Patient not taking: Reported on 4/9/2025), Disp: , Rfl:     ondansetron ODT (ZOFRAN-ODT) 4 MG disintegrating tablet, Take 1 tablet by mouth. (Patient not taking: Reported on 6/23/2025), Disp: , Rfl:     triamcinolone (KENALOG) 0.1 % cream, Apply 1 Application topically to the appropriate area as directed As Needed. (Patient not taking: Reported on 4/9/2025), Disp: , Rfl:     Objective     Physical Exam:  Vital Signs:   Vitals:    06/23/25 1343   BP: 127/68   Pulse: 77   SpO2: 98%   Weight: 126 kg (278 lb 6.4 oz)   Height: 157 cm (61.81\")     Body mass index is 51.23 kg/m².     Mental Status Exam:   MENTAL STATUS EXAM   General Appearance:  Cleanly groomed and dressed  Eye Contact:  Good eye contact  Attitude:  Cooperative  Motor Activity:  Normal gait, posture  Muscle Strength:  Normal  Speech:  Normal rate, tone, volume  Language:  Spontaneous  Mood and affect:  Depressed and flat  Hopelessness:  Denies  Loneliness: Denies  Thought Process:  Logical  Associations/ Thought Content:  No delusions  Hallucinations:  None  Suicidal Ideations:  Not present  Homicidal Ideation:  Not present  Sensorium:  Alert and clear  Orientation:  Person, place, time and situation  Attention Span/ Concentration:  Good  Fund of Knowledge:  Appropriate for age and educational level  Intellectual Functioning:  Average range  Insight:  Good  Judgement:  Good  Reliability:  Good  Impulse Control:  Good       @RESULASTCBCDIFFPANEL,TSH,LABLIPI,RDLSROPI20,WIGVQQSN42,MG,FOLATE,PROLACTIN,CRPRESULT,CMP,U2BABIPSBKX)@    Lab Results   Component Value Date    GLUCOSE 153 (H) 03/17/2025    BUN 9 03/17/2025    CREATININE 0.59 03/17/2025    EGFR 113.4 03/17/2025    BCR 15.3 03/17/2025    K 3.8 " 03/17/2025    CO2 27.0 03/17/2025    CALCIUM 8.7 03/17/2025    ALBUMIN 3.8 11/07/2024    BILITOT 0.3 11/07/2024    AST 21 11/07/2024    ALT 15 11/07/2024       Lab Results   Component Value Date    WBC 7.66 03/17/2025    HGB 13.8 03/17/2025    HCT 41.4 03/17/2025    MCV 86.3 03/17/2025     03/17/2025       Lab Results   Component Value Date    CHLPL 173 05/23/2018    TRIG 96 05/23/2018    HDL 50 05/23/2018     (H) 05/23/2018 06/23/25 1400   Facial and Oral Movements   Muscles of Facial Expression 0   Lips and Perioral Area 0   Jaw 0   Tongue 0   Extremity Movements   Upper (arms, wrists, hands, fingers) 0   Lower (legs, knees, ankles, toes) 0   Trunk Movements   Neck, shoulders, hips 0   Overall Severity   Severity of abnormal movements (max 4) 0   Incapacitation due to abnormal movements 0   Patient's awareness of abnormal movements (rate only patient's report) 0   Dental Status   Current problems with teeth and/or dentures? No   Does patient usually wear dentures? No         TSH          11/7/2024    13:08   TSH   TSH 1.450           Assessment / Plan      Assessment:   Pt is a 45 yo female. Patient was seen for initial evaluation on 5/21/2025.  During initial evaluation patient reported that she has been struggling with anxiety and depression since childhood. Started taking medication for depression and anxiety about 7-8 years ago. Stated she has only tried Zoloft but has only gone up to 100mg daily. Pt is currently taking Zoloft 50 BID for about 2 years now. States she has not seen any benefit with the Zoloft. States she also struggles with irritability. Pt's primary complaints during initial evaluation were anxiety, depression, and irritability. Pt did come to initial evaluation with her friend. Friend states that pt will chew toilet paper because she gets so anxious.  Patient did report some possible psychotic symptoms (hearing her father's voice and seeing the [employer]).  Patient's  friend (who does live with patient) reported she often hears patient talking to herself in her room but the patient states that she is praying.  When provider questioned patient and her friend about symptoms of hypomania/polina, patient's friend stated that patient has had episodes that last 2 to 3 days where she has very odd interests such as rock music without having any prior interest before.  Patient's friend states that she would talk very aggressively about rock music and only listen to it for about 2 to 3 days.  Friend states that the patient will return back to normal after about 3 days.  After interviewing patient during initial evaluation, discussed with patient that her symptoms do seem to align with MDD with possible psychotic features and VÍCTOR with panic attacks.  Discussed with patient that we would keep bipolar 2 disorder as a differential but currently her symptoms of hypomania technically do not meet criteria for a hypomanic episode but will keep with the diagnosis of bipolar 2 as a differential.  During initial evaluation patient was continued on Zoloft 100 mg daily and Latuda 20 mg daily was added to treat patient's anxiety and depressive symptoms.  Latuda was chosen because it is more metabolic friendly and patient is diabetic.  Patient's lipid panel and A1c was reviewed from April 2025. Today reports to still feel depressed and anxious nearly every day. Pt's PHQ9 and GAD7 are still elevated. Will increase Latuda to 40mg daily to hopefully improve anxiety, depressive symptoms, and psychotic symptoms.  Patient has an appointment to start therapy soon.  Patient's next appointment will be at the [facility] location in 4 weeks.  Continue Zoloft 100 mg daily.    Diagnoses and all orders for this visit:    1. Severe episode of recurrent major depressive disorder, with psychotic features (Primary)    2. Generalized anxiety disorder    Other orders  -     lurasidone (Latuda) 40 MG tablet  tablet; Take 1 tablet by mouth Daily for 60 days.  Dispense: 30 tablet; Refill: 1       Differential:   Bipolar DO - Patient's episodes of hypomanic like symptoms technically do not meet criteria for a hypomanic episode keep bipolar 2 as a differential.     Plan:   - Continue Zoloft 100mg daily.   - Increase Latuda to 40mg daily to improve anxiety, depressive symptoms, and possible psychotic symptoms.   - Start therapy.   - Discussed medication options and treatment plan of prescribed medication as well as the risks, benefits, and side effects   - Encouraged pt to continue supportive psychotherapy efforts and medications as indicated.  - Educated pt on signs and symptoms of serotonin syndrome and notified pt to go to the ER if experiencing these symptoms.   - Notified pt that antidepressants can sometimes cause worsening SI and to monitor for this.   - Notified pt that antipsychotics can increase cholesterol levels, blood sugar, wt, and BP.   - Educated pt on EPS/TD and to notify provider is they experience these symptoms.     Short-term goals: Develop rapport with patient.     Long-term goals: Symptom reduction with medication and therapy.     Weakness: Family currently lives far from her.     Strengths: Great support from friend.    Continue supportive psychotherapy efforts and medications as indicated. Treatment and medication options discussed during today's visit. Patient ackowledged and verbally consented to continue with current treatment plan and was educated on the importance of compliance with treatment and follow-up appointments. Patient seems reasonably able to adhere to treatment plan.      Medication Considerations:  Discussed medication options and treatment plan of prescribed medication(s) as well as the risks, benefits, and potential side effects. Patient is agreeable to call the office with any worsening of symptoms or onset of side effects. Patient is agreeable to call 911 or go to the nearest ER  should he/she begin having SI/HI.    Quality Measures:   Pt does not use tobacco products.     Constantin reviewed and is appropriate.    Follow Up:   Return in about 4 weeks (around 7/21/2025), or [facility], for Recheck.    Copied text in this note has been reviewed and is accurate as of 6/23/2025.    Part of this note may be an electronic transcription/translation of spoken language to printed text using the Dragon Dictation System.    SARAVANAN Whitlock, PMHNP-BC

## 2025-06-26 ENCOUNTER — HOSPITAL ENCOUNTER (OUTPATIENT)
Dept: PHYSICAL THERAPY | Facility: HOSPITAL | Age: 45
Setting detail: THERAPIES SERIES
Discharge: HOME | End: 2025-06-26
Payer: MEDICAID

## 2025-06-26 DIAGNOSIS — R26.89 IMPAIRED GAIT AND MOBILITY: ICD-10-CM

## 2025-06-26 DIAGNOSIS — Z74.09 IMPAIRED MOBILITY: ICD-10-CM

## 2025-06-26 DIAGNOSIS — M25.561 RIGHT KNEE PAIN, UNSPECIFIED CHRONICITY: Primary | ICD-10-CM

## 2025-06-26 PROCEDURE — 97535 SELF CARE MNGMENT TRAINING: CPT

## 2025-06-26 PROCEDURE — 97110 THERAPEUTIC EXERCISES: CPT

## 2025-06-26 NOTE — THERAPY TREATMENT NOTE
Outpatient Physical Therapy Ortho Treatment Note  [facility]     Patient Name: Sarah Chan  : 1980  MRN: [id number]  Today's Date: 2025      Visit Date: 2025    Visit Dx:    ICD-10-CM ICD-9-CM   1. Right knee pain, unspecified chronicity  M25.561 719.46   2. Impaired gait and mobility  R26.89 781.2   3. Impaired mobility  Z74.09 799.89       Patient Active Problem List   Diagnosis    Screening mammogram for breast cancer    Irregular menses    Anemia    Abnormal uterine bleeding (AUB)    Abnormal uterine bleeding        Past Medical History:   Diagnosis Date    Bipolar 1 disorder     Diabetes     TYPE 2    Encounter for blood transfusion     NO REACTION    History of Bell's palsy     Hypertension     Irregular menses         Past Surgical History:   Procedure Laterality Date    D & C HYSTEROSCOPY N/A 3/24/2025    Procedure: DILATATION AND CURETTAGE HYSTEROSCOPY and placement of intrauteerine device;  Surgeon: Inez Mcclain MD;  Location: [facility];  Service: Obstetrics/Gynecology;  Laterality: N/A;    TONSILLECTOMY                          PT Assessment/Plan       Row Name 25 0800          PT Assessment    Assessment Comments Pt returns to PT w/ reports of increased pain and discomfort after her initial tx session last week. Pt stating that she continues to have elevated knee pain and is having a much rougher day due to her pain. Pt did purchase a brace and she reports good support while donning but did not bring in today as she did not want to rely on it. Educated on importance of pain management early on and her need to don the brace if she is having good outcomes. Provided pt w/ very gentle exercises to perform this date w/ emphasis on pain management. Did not update HEP as she did not respond well to her initial session and want to assess her response over the weekend prior to updating. Attempted STS as she reports no pain with activity, but only able to  complete 5 reps prior to onset of increased pain. Discussed potential use of STC to allow appropriate recovery of her tissues while ambulating w/out an increase in pain. Recommended pt focus on rest and recovery over this weekend in preparation for her session on 6/30.  -CS        PT Plan    PT Plan Comments Assess reponse to last session, change/update HEP if tolerating last session well, assess brace or discuss use of brace for exercises as she reports good response to use, consider adding side steps to assess lateral movement patterns. Consider perform ambulation training w/ cane, Pt not responding well to WB activities at this time, may be able to tolerate 3D hip w/ BUE support.  -CS               User Key  (r) = Recorded By, (t) = Taken By, (c) = Cosigned By      Initials Name Provider Type    CS Tello Copeland, PT Physical Therapist                       OP Exercises       Row Name 06/26/25 0800             Subjective    Subjective Comments I'm having a bad day, my knee is really bad today  -CS         Subjective Pain    Able to rate subjective pain? yes  -CS      Pre-Treatment Pain Level 9  -CS      Post-Treatment Pain Level 10  -CS         Total Minutes    11526 - PT Therapeutic Exercise Minutes 20  -CS         Exercise 1    Exercise Name 1 nustep  -CS      Time 1 5 min, L2  -CS         Exercise 2    Exercise Name 2 QS  -CS      Cueing 2 Verbal;Demo  -CS      Reps 2 11  -CS      Time 2 5s  -CS      Additional Comments towel under knee, very gentle  -CS         Exercise 3    Exercise Name 3 QS+SLR  -CS      Cueing 3 Verbal;Demo  -CS      Sets 3 1  -CS      Reps 3 5  -CS      Additional Comments reset after each  -CS         Exercise 7    Exercise Name 7 LAQ  -CS      Cueing 7 Verbal;Demo  -CS      Sets 7 2R  -CS      Reps 7 10  -CS         Exercise 8    Exercise Name 8 seated HS curls  -CS      Cueing 8 Verbal;Demo  -CS      Sets 8 2R  -CS      Reps 8 10  -CS      Time 8 RTB  -CS         Exercise 9    Exercise  Name 9 Seated HS stretch  -CS      Cueing 9 Verbal;Demo  -CS      Reps 9 3  -CS      Time 9 20s  -CS         Exercise 10    Exercise Name 10 STS  -CS      Cueing 10 Verbal  -CS      Sets 10 1  -CS      Reps 10 5  -CS      Additional Comments Elevated mat (room 5)  -CS                User Key  (r) = Recorded By, (t) = Taken By, (c) = Cosigned By      Initials Name Provider Type    CS Tello Copeland, PT Physical Therapist                                  PT OP Goals       Row Name 06/26/25 0800          PT Short Term Goals    STG Date to Achieve 07/13/25  -CS     STG 1 pt. to be I with initial HEP to facilitate self management of their condition  -CS     STG 1 Progress Ongoing  -CS     STG 2 pt. to be educated in/verbalize understanding of the importance of posture/ergonomics in association with their condition to facilitate self management of their condition  -CS     STG 2 Progress Ongoing  -CS     STG 3 pt. to ambulate with near normal heel to toe gait pattern with SC to facilitate ease/safety with community mobility  -CS     STG 3 Progress Ongoing  -CS        Long Term Goals    LTG Date to Achieve 09/11/25  -CS     LTG 1 pt. to be I with advanced HEP to facilitate self management of their condition  -CS     LTG 1 Progress Ongoing  -CS     LTG 2 pt to demonstrate Right knee AROM >/= 2-115 (seated) degrees to facilitate ease/safety with gait  -CS     LTG 2 Progress Ongoing  -CS     LTG 3 pt. to ascend/descends stairs with reciprocal pattern (</= 1 rails) to facilitate ease/safety of household/community mobility  -CS     LTG 3 Progress Ongoing  -CS     LTG 4 pt. to ambulate without AD with near normal heel to toe gait pattern to facilitate ease/safety of community mobility  -CS     LTG 4 Progress Ongoing  -CS     LTG 5 To demonstrate /perform greater than or equal to 10 repetitions of sit to stand during the 30 s sit to stand test demonstrating improved hip girdle and core strength allowing for greater ease and safety  with household and community mobility  -CS     LTG 5 Progress Ongoing  -CS               User Key  (r) = Recorded By, (t) = Taken By, (c) = Cosigned By      Initials Name Provider Type    CS Tello Copeland, PT Physical Therapist                    Therapy Education  Education Details: Acitivity modifications, pain management, wearing her brace regularly during acute phase  Given: Symptoms/condition management, Pain management  Program: Reinforced, New  How Provided: Verbal, Demonstration  Provided to: Patient  Level of Understanding: Teach back education performed, Verbalized, Demonstrated  99625 - PT Self Care/Mgmt Minutes: 15              Time Calculation:   Start Time: 0810  Stop Time: 0845  Time Calculation (min): 35 min  Timed Charges  46288 - PT Therapeutic Exercise Minutes: 20  06092 - PT Self Care/Mgmt Minutes: 15  Total Minutes  Timed Charges Total Minutes: 35   Total Minutes: 35  Therapy Charges for Today       Code Description Service Date Service Provider Modifiers Qty    [id number]  PT THER PROC EA 15 MIN 6/26/2025 Tello Copeland, PT GP 1    80140951859 HC PT SELF CARE/MGMT/TRAIN EA 15 MIN 6/26/2025 Tello Copeland, PT GP 1                      Tello Copeland PT  6/26/2025

## 2025-06-27 ENCOUNTER — OFFICE VISIT (OUTPATIENT)
Dept: BEHAVIORAL HEALTH | Facility: CLINIC | Age: 45
End: 2025-06-27
Payer: MEDICAID

## 2025-06-27 DIAGNOSIS — F41.1 GENERALIZED ANXIETY DISORDER: ICD-10-CM

## 2025-06-27 DIAGNOSIS — F33.3 SEVERE EPISODE OF RECURRENT MAJOR DEPRESSIVE DISORDER, WITH PSYCHOTIC FEATURES: Primary | ICD-10-CM

## 2025-06-27 NOTE — TREATMENT PLAN
Multi-Disciplinary Problems (from Behavioral Health Treatment Plan)      Active Problems       Problem: Anxiety  Start Date: 06/27/25      Problem Details: The patient self-scales this problem as a 10 with 10 being the worst.          Goal Priority Start Date Expected End Date End Date    Patient will develop and implement behavioral and cognitive strategies to reduce anxiety and irrational fears. High 06/27/25 12/26/25 --    Goal Details: Progress toward goal:  Not appropriate to rate progress toward goal since this is the initial treatment plan.        Goal Intervention Frequency Start Date End Date    Help patient explore past emotional issues in relation to present anxiety. Weekly 06/27/25 --    Intervention Details: Duration of treatment until discharged.        Goal Intervention Frequency Start Date End Date    Help patient develop an awareness of their cognitive and physical responses to anxiety. Weekly 06/27/25 --    Intervention Details: Duration of treatment until discharged.                Problem: Depression  Start Date: 06/27/25      Problem Details: The patient self-scales this problem as a 10 with 10 being the worst.          Goal Priority Start Date Expected End Date End Date    Patient will demonstrate the ability to initiate new constructive life skills outside of sessions on a consistent basis. High 06/27/25 12/26/25 --    Goal Details: Progress toward goal:  Not appropriate to rate progress toward goal since this is the initial treatment plan.        Goal Intervention Frequency Start Date End Date    Assist patient in setting attainable activities of daily living goals. PRN 06/27/25 --      Goal Intervention Frequency Start Date End Date    Provide education about depression Weekly 06/27/25 --    Intervention Details: Duration of treatment until discharged.        Goal Intervention Frequency Start Date End Date    Assist patient in developing healthy coping strategies. Weekly 06/27/25 --     Intervention Details: Duration of treatment until discharged.                        Reviewed By       Amanda Rodriguez LCSW 06/27/25 4804                     I have discussed and reviewed this treatment plan with the patient.

## 2025-06-27 NOTE — PROGRESS NOTES
"Patient ID: Sarah Chan is a 44 y.o. female presenting to [facility] for assessment with Cynthia Rodriguez LCSW    Time: 60 minutes  PCP: Guille Stringer NP   Referring Provider: SARAVANAN Whitlock    Patient Chief Complaint: \"Do not know, I do not know what is going on with me.\"  Description of current emotional/behavioral concerns: Patient presented to therapy for an evaluation of anxiety and depressive symptoms.  Patient reports that she has intense depressive symptoms including high irritability and low frustration tolerance, low energy, poor motivation, poor appetite and difficulty sleeping.  Patient reports that the symptoms have been going on for quite some time.  Patient was very tearful during the session and reported no current stressors other than the depressive symptoms.  Patient rates her depression level as a 10 out of 10 and her anxiety level as a 10 out of 10 patient also reports high hopelessness but denies any suicidal ideations.  She reports past suicidal ideations but she was able to release these thoughts in the past fairly easily.  No prior suicide attempt and does not do anything to injure herself.  Patient denies homicidal ideations or any history of aggression towards others.  Patient is experiencing audio and visual hallucinations reporting that she hears whispers and hears the voice of the Virgin Kristen as well as seeing her father as a spirit.  Patient is bothered by these hallucinations but denies any hallucinations patient reports that she has a poor appetite and eats only 1 meal a day but is continuing to gain weight which is frustrating to her.  Patient reports that she has difficulty going to sleep and is up and down frequently in the night.  She gets approximately 4 hours of sleep at night.    In terms of past history the patient reports that she completed the ninth grade as her highest level of education.  She does not have a GED.  She " currently is unemployed and has not worked in several years.  She was recommended to apply for disability but has not done this yet and does not know the process of this.  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  Patient does feel that her mental health symptoms have prevented her from being able to work currently.  Patient denies any  history or legal history.  Patient denies any significant trauma but does report the loss of her father as a significant difficult loss for her.              Patient adamantly and convincingly denies current suicidal or homicidal ideation or perceptual disturbance.    Significant Life Events  Has patient been through or witnessed a divorce? no  Patient is currently  from her  of 26 years and has been  for about 3 years.  They are unsure if they will divorce or not.    Has patient experienced a death or loss? yes  Patient's father  several years ago and this was very difficult and traumatic for her to deal with.    Has patient experienced a major traumatic event? no  Patient denies    Has patient experienced any other significant life events or trauma (such as verbal, physical, sexual abuse)? no  Patient denies    Work/School History  Highest level of education obtained: 9th grade    Currently in school: no    Difficulties in school: Patient is unsure if she had learning disabilities however she is  and may have had some cultural barriers there.    Ever been active duty in the ? no    Patient's Occupation: Unemployed    Describe patient's current and past work experience: Patient has worked in the past as a  and cleaning homes.  She reports an inability to work due to some physical issues as well as significant depressive symptoms.      Legal History  The patient has no significant history of legal issues.    Interpersonal/Relational  Marital  Status:   Children: None  Family of origin stressors: Patient reports that her parents were  and remained  until her father's passing several years ago.  She was close with both and his passing was very difficult for her.  She has 5 sisters and 3 brothers and feels that she has a distant relationship with them for many years.  They do call her but the conversations are very short.  Patient reports no known mental illness or substance abuse issues in her family yet reports that her brother is on disability for some mental health reason.  Patient's current living situation: Patient currently lives in a house with her friend whom she calls her sister at heart and with her friend's fiancé.  Support system: parent, [employer], and friends  Difficulty getting along with peers: no  Difficulty making new friendships: no  Difficulty maintaining friendships: no  Close with family members: yes    Mental/Behavioral Health History  History of prior treatment or hospitalization: Patient has done outpatient counseling several years ago and found this to be unsuccessful.  Patient reports no inpatient behavioral health hospitalizations.    Are there any significant health issues: See diagnoses list   History of seizures: no    Family History   Problem Relation Age of Onset    Stroke Father     Chrystal Hyperthermia Neg Hx        Current Medications:   Current Outpatient Medications   Medication Sig Dispense Refill    cyanocobalamin (VITAMIN B-12) 500 MCG tablet Take 2 tablets by mouth Daily.      famotidine (PEPCID) 20 MG tablet Take 1 tablet by mouth As Needed.      ferrous sulfate 325 (65 FE) MG tablet Take 1 tablet by mouth Daily With Breakfast. 30 tablet 0    folic acid (FOLVITE) 1 MG tablet Take 1 tablet by mouth Daily.      hydrocortisone 1 % cream Apply 1 Application topically to the appropriate area as directed 2 (Two) Times a Day. 28 g 0    Levonorgestrel (Mirena, 52 MG,) 20 MCG/DAY intrauterine  device IUD To be inserted one time by prescriber. Route intrauterine. 1 each 0    lisinopril-hydrochlorothiazide (PRINZIDE,ZESTORETIC) 20-25 MG per tablet Take 1 tablet by mouth Daily. DO NOT TAKE FOR 24 HOURS BEFORE SURGERY      lovastatin (MEVACOR) 10 MG tablet Take 1 tablet by mouth Daily.      lurasidone (Latuda) 40 MG tablet tablet Take 1 tablet by mouth Daily for 60 days. 30 tablet 1    meloxicam (MOBIC) 7.5 MG tablet Take 1 tablet by mouth Daily.      metFORMIN (GLUCOPHAGE) 1000 MG tablet Take 1 tablet by mouth Daily With Breakfast.      metFORMIN (GLUCOPHAGE) 1000 MG tablet Take 1 tablet by mouth 2 (Two) Times a Day With Meals.      Norethin-Eth Estrad-Fe Biphas (Lo Loestrin Fe) 1 MG-10 MCG / 10 MCG tablet Take 1 tablet by mouth Daily. (Patient not taking: Reported on 4/9/2025) 28 tablet 3    nystatin (MYCOSTATIN) 990384 UNIT/GM cream 1 Application As Needed. (Patient not taking: Reported on 4/9/2025)      ondansetron ODT (ZOFRAN-ODT) 4 MG disintegrating tablet Take 1 tablet by mouth. (Patient not taking: Reported on 6/23/2025)      Ozempic, 0.25 or 0.5 MG/DOSE, 2 MG/3ML solution pen-injector Inject 0.5 mg under the skin into the appropriate area as directed Every 7 (Seven) Days.      sertraline (ZOLOFT) 50 MG tablet Take 1 tablet by mouth 2 (Two) Times a Day. Takes one in am and one in pm      traMADol (ULTRAM) 50 MG tablet Take 1 tablet by mouth Every 6 (Six) Hours As Needed for Moderate Pain or Severe Pain. 5 tablet 0    triamcinolone (KENALOG) 0.1 % cream Apply 1 Application topically to the appropriate area as directed As Needed. (Patient not taking: Reported on 4/9/2025)       No current facility-administered medications for this visit.       History of Substance Use:     Social History     Socioeconomic History    Marital status:    Tobacco Use    Smoking status: Never    Smokeless tobacco: Never   Vaping Use    Vaping status: Never Used   Substance and Sexual Activity    Alcohol use: Yes      Comment: Very rarely    Drug use: Never    Sexual activity: Yes     Partners: Male          PHQ-Score Total:  PHQ-9 Total Score: 21    PHQ-9 Depression Screening  Little interest or pleasure in doing things? Almost all   Feeling down, depressed, or hopeless? Almost all   PHQ-2 Total Score 6   Trouble falling or staying asleep, or sleeping too much? Over half   Feeling tired or having little energy? Almost all   Poor appetite or overeating? Over half   Feeling bad about yourself - or that you are a failure or have let yourself or your family down? Almost all   Trouble concentrating on things, such as reading the newspaper or watching television? Almost all   Moving or speaking so slowly that other people could have noticed? Or the opposite - being so fidgety or restless that you have been moving around a lot more than usual? Over half   Thoughts that you would be better off dead, or of hurting yourself in some way? Not at all   PHQ-9 Total Score 21   If you checked off any problems, how difficult have these problems made it for you to do your work, take care of things at home, or get along with other people? Extremely difficult        VÍCTOR-7 Total Score:   Over the last two weeks, how often have you been bothered by the following problems?  Feeling nervous, anxious or on edge: Nearly every day  Not being able to stop or control worrying: Nearly every day  Worrying too much about different things: Nearly every day  Trouble Relaxing: Nearly every day  Being so restless that it is hard to sit still: More than half the days  Becoming easily annoyed or irritable: Nearly every day  Feeling afraid as if something awful might happen: Nearly every day  VÍCTOR 7 Total Score: 20  If you checked any problems, how difficult have these problems made it for you to do your work, take care of things at home, or get along with other people: Extremely difficult     (Scales based on 0 - 10 with 10 being the worst)  Depression: 10 Anxiety:  "10       SUICIDE RISK ASSESSMENT/CSSRS  1. Does patient have thoughts of suicide? no  2. Does patient have intent for suicide? no  3. Does patient have a current plan for suicide? no  4. History of suicide attempts: no  5. Family history of suicide or attempts: no  6. History of violent behaviors towards others or property or thoughts of committing suicide: no  7. History of sexual aggression toward others: no  8. Access to firearms or weapons: no    Mental Status Exam:   Hygiene:   good  Cooperation:  Cooperative  Eye Contact:  Poor  Psychomotor Behavior:  Restless  Affect:  Restricted and Blunted  Mood: sad, depressed, and anxious  Hopelessness: 10  Speech:  Minimal  Thought Process:  Goal directed  Thought Content:  Normal  Suicidal:  None  Homicidal:  None  Hallucinations:  Auditory and Visual  Delusion: None noted  Memory:  Intact  Orientation:  Person, Place, Time, and Situation  Reliability:  good  Insight:  Fair  Judgement:  Fair  Impulse Control:  Good    Impression/Formulation:    VISIT DIAGNOSIS:     ICD-10-CM ICD-9-CM   1. Severe episode of recurrent major depressive disorder, with psychotic features  F33.3 296.34   2. Generalized anxiety disorder  F41.1 300.02        Patient appeared alert and oriented.  Patient is voluntarily requesting to begin outpatient therapy at [facility]. Patient is receptive to assistance with maintaining a stable lifestyle.  Patient presents with history of anxiety and depressive symptoms.  Patient is agreeable to attend routine therapy sessions.  Patient expressed desire to maintain stability and participate in the therapeutic process.                  Crisis Plan:  Symptoms and/or behaviors to indicate a crisis: Excessive worry or fear, Feeling sad or low, Extreme mood changes; including uncontrollable \"highs\" or euphoria, Lack of sleep, and Thinking about suicide    What calming techniques or other strategies will patient use to de-esclate and " stay safe: slow down, breathe, visualize calming self, think it though, listen to music, change focus, take a walk    Who is one person patient can contact to assist with de-escalation?  Her friends    If symptoms/behaviors persist, patient will present to the nearest hospital for an assessment.     Treatment Plan:   Continue supportive psychotherapy efforts and medications as indicated.   Obtain release of information for current treatment team for continuity of care as needed.   Patient will adhere to medication regimen as prescribed and report any side effects.   Patient will contact this office, call 911 or present to the nearest emergency room should suicidal or homicidal ideations occur.    Short Term Goals:   Patient will be compliant with medication, and will have no significant medication related side effects.   Patient will be engaged in psychotherapy as indicated.   Patient will report subjective improvement of symptoms.     Long Term Goals:   To stabilize anxiety and depressive symptoms and treat/improve subjective symptoms  Patient will stay out of the hospital and will be at optimal level of functioning.   Patient will take all medications as prescribed    The patient verbalized understanding and agreement with goals that were mutually set.     Patient was given instructions and counseling regarding her condition or for health maintenance advice. Please see specific information pulled into the AVS if appropriate.     Recommended Referrals: None at this time           This document has been electronically signed by Amanda Rodriguez LCSW  June 27, 2025 12:38 EDT      Part of this note may be an electronic transcription/translation of spoken language to printed text using the Dragon Dictation System.

## 2025-06-27 NOTE — PATIENT INSTRUCTIONS
Homework assignment: Practice mindfulness and vagus nerve exercises at least daily.  Complete mindfulness worksheet.

## 2025-06-30 ENCOUNTER — APPOINTMENT (OUTPATIENT)
Dept: PHYSICAL THERAPY | Facility: HOSPITAL | Age: 45
End: 2025-06-30
Payer: MEDICAID

## 2025-07-01 ENCOUNTER — OFFICE VISIT (OUTPATIENT)
Dept: BEHAVIORAL HEALTH | Facility: CLINIC | Age: 45
End: 2025-07-01
Payer: MEDICAID

## 2025-07-01 DIAGNOSIS — F41.1 GENERALIZED ANXIETY DISORDER: ICD-10-CM

## 2025-07-01 DIAGNOSIS — F33.3 SEVERE EPISODE OF RECURRENT MAJOR DEPRESSIVE DISORDER, WITH PSYCHOTIC FEATURES: Primary | ICD-10-CM

## 2025-07-01 NOTE — PROGRESS NOTES
"Date: July 1, 2025  Time In: 8 AM  Time Out: 8:50 AM      PROGRESS NOTE  Data:  Sarah Chan is a 44 y.o. female who presents today for individual therapy session at [facility] with Cynthia Rodriguez LCSW.     Patient Chief Complaint: \"I am about the same.\"    Clinical Maneuvering/Intervention:        The patient presents for evaluation of anxiety and depression.    She reports a persistent state of frustration and self-directed anger, feeling as though she has disappointed those around her. Her energy levels have been significantly impacted, leading to a lack of interest in activities such as watching movies or television. She spends most of her time resting or sleeping. Despite these challenges, she expresses a desire to improve her mental health and return to a sense of normalcy. She has attempted some mindfulness exercises, including breathing techniques, which she found beneficial. However, she has not yet tried any sensory exercises.       (Scales based on 0 - 10 with 10 being the worst)  Depression: 10 Anxiety: 10       Assisted patient in processing above session content; acknowledged and normalized patient’s thoughts, feelings, and concerns. Rationalized patient thought process regarding continued symptoms of anxiety and depression. Discussed triggers associated with patient's anxiety and depressive symptoms. Also discussed coping skills for patient to implement such as explored use of mindfulness and breathing exercises as well as checking the facts of what strong emotion tells us.    Allowed patient to freely discuss issues without interruption or judgment. Provided safe, confidential environment to facilitate the development of positive therapeutic relationship and encourage open, honest communication. Assisted patient in identifying risk factors which would indicate the need for higher level of care including thoughts to harm self or others and/or self-harming behavior and " encouraged patient to contact this office, call 911, or present to the nearest emergency room should any of these events occur. Discussed crisis intervention services and means to access. Patient adamantly and convincingly denies current suicidal or homicidal ideation or perceptual disturbance.    Assessment   Patient appears to maintain relative stability as compared to their baseline. However, patient continues to struggle with anxiety and depressive symptoms which continues to cause impairment in important areas of functioning. A result, they can be reasonably expected to continue to benefit from treatment and would likely be at increased risk for decompensation otherwise.    Mental Status Exam:   Hygiene:   good  Cooperation:  Cooperative  Eye Contact:  Poor  Psychomotor Behavior:  Restless  Affect:  Restricted and Blunted  Mood: sad, depressed, and panicky  Speech:  Minimal  Thought Process:  Linear  Thought Content:  Normal  Suicidal:  None  Homicidal:  None  Hallucinations:  None  Delusion:  None  Memory:  Intact  Orientation:  Person, Place, Time, and Situation  Reliability:  good  Insight:  Fair  Judgement:  Fair  Impulse Control:  Good  Physical/Medical Issues:  No        Patient's Support Network Includes:  mother, extended family, and friends    Functional Status: Moderate impairment     Progress toward goal: Not at goal    Prognosis: Good with Ongoing Treatment        1. Anxiety and depression.  Her symptoms are indicative of anxiety and depression, which are significantly impacting her daily life. She was advised to continue with the mindfulness exercises, particularly focusing on breathing techniques to help manage her anxiety and depression. The importance of challenging negative thoughts and engaging in activities that counteract depressive tendencies was emphasized. She was encouraged to use fidget tools as a means of managing anxious energy. A containment box exercise was also introduced to help  her manage negative thoughts. She was instructed to practice vagus nerve exercises to promote a sense of safety in her body. She was also encouraged to check the facts of her thoughts and use the containment box for thoughts that lack factual basis.    Follow-up  A follow-up appointment is scheduled for next week.            Plan     Resources: Patient was provided with the following community resources: None at this time    Patient will continue in individual outpatient therapy with focus on improved functioning and coping skills, maintaining stability, and avoiding decompensation and the need for higher level of care.    Patient will adhere to any medication regimens as prescribed and report any side effects. Patient will contact this office, call 911 or present to the nearest emergency room should suicidal or homicidal ideations occur. Provide cognitive behavioral therapy and solution focused therapy to improve functioning, maintain stability and avoid decompensation and the need for higher level of care.     Return at first available appointment or earlier if symptoms worsen or fail to improve.           VISIT DIAGNOSIS:     ICD-10-CM ICD-9-CM   1. Severe episode of recurrent major depressive disorder, with psychotic features  F33.3 296.34   2. Generalized anxiety disorder  F41.1 300.02        Patient was given instructions and counseling regarding her condition or for health maintenance advice. Please see specific information pulled into the AVS if appropriate.     Patient or patient representative verbalized consent for the use of Ambient Listening during the visit with  Amanda Rodriguez LCSW for chart documentation. 7/1/2025  08:57 EDT     This document has been electronically signed by Amanda Rodriguez LCSW   July 1, 2025 08:57 EDT      Part of this note may be an electronic transcription/translation of spoken language to printed text using the Dragon Dictation System.

## 2025-07-01 NOTE — PATIENT INSTRUCTIONS
Homework assignment: Continue to practice mindfulness and breathing exercises to assess your thoughts, feelings and body sensations.  Check the facts that strong emotions are telling you and utilize wise mind to make the best decision for yourself in the moment.

## 2025-07-02 ENCOUNTER — APPOINTMENT (OUTPATIENT)
Dept: PHYSICAL THERAPY | Facility: HOSPITAL | Age: 45
End: 2025-07-02
Payer: MEDICAID

## 2025-07-07 ENCOUNTER — APPOINTMENT (OUTPATIENT)
Dept: PHYSICAL THERAPY | Facility: HOSPITAL | Age: 45
End: 2025-07-07
Payer: MEDICAID

## 2025-07-14 ENCOUNTER — HOSPITAL ENCOUNTER (OUTPATIENT)
Dept: PHYSICAL THERAPY | Facility: HOSPITAL | Age: 45
Setting detail: THERAPIES SERIES
Discharge: HOME | End: 2025-07-14
Payer: MEDICAID

## 2025-07-14 DIAGNOSIS — Z74.09 IMPAIRED MOBILITY: ICD-10-CM

## 2025-07-14 DIAGNOSIS — M25.561 RIGHT KNEE PAIN, UNSPECIFIED CHRONICITY: Primary | ICD-10-CM

## 2025-07-14 DIAGNOSIS — R26.89 IMPAIRED GAIT AND MOBILITY: ICD-10-CM

## 2025-07-14 PROCEDURE — 97530 THERAPEUTIC ACTIVITIES: CPT | Performed by: PHYSICAL THERAPIST

## 2025-07-14 PROCEDURE — 97110 THERAPEUTIC EXERCISES: CPT | Performed by: PHYSICAL THERAPIST

## 2025-07-14 NOTE — Clinical Note
Ms. Chan is a 43 y/o female.  She has attended 4 sessions of physical therapy from  for her R knee pain.  Treatment has included therapeutic exercise, manual therapy, therapeutic activity, and patient education with home exercise program . Progress towards goals is x having met 2 of 3 STG's and x of 5 LTG's. See ortho section for updated HEP. She presents ambulating without assistive device, with noted increased lateral sway over the right lower extremity during stance phase.  Reviewed anatomy of the knee and physiology of healing particularly pain attention to realistic expectations in terms of timeframes and outcomes.

## 2025-07-14 NOTE — THERAPY PROGRESS REPORT/RE-CERT
Outpatient Physical Therapy Ortho Progress Note  [facility]     Patient Name: Sarah Chan  : 1980  MRN: [id number]  Today's Date: 2025      Visit Date: 2025    Visit Dx:    ICD-10-CM ICD-9-CM   1. Right knee pain, unspecified chronicity  M25.561 719.46   2. Impaired gait and mobility  R26.89 781.2   3. Impaired mobility  Z74.09 799.89       Patient Active Problem List   Diagnosis    Screening mammogram for breast cancer    Irregular menses    Anemia    Abnormal uterine bleeding (AUB)    Abnormal uterine bleeding        Past Medical History:   Diagnosis Date    Bipolar 1 disorder     Diabetes     TYPE 2    Encounter for blood transfusion     NO REACTION    History of Bell's palsy     Hypertension     Irregular menses         Past Surgical History:   Procedure Laterality Date    D & C HYSTEROSCOPY N/A 3/24/2025    Procedure: DILATATION AND CURETTAGE HYSTEROSCOPY and placement of intrauteerine device;  Surgeon: Inez Mcclain MD;  Location: [facility];  Service: Obstetrics/Gynecology;  Laterality: N/A;    TONSILLECTOMY          PT Ortho       Row Name 25 0900       Posture/Observations    Genu valgus Right:;Moderate  -GJ       Knee Special Tests    Patellofemoral apprehension sign (instability) Right:;Negative  -GJ       Right Lower Ext    Rt Knee Extension/Flexion AROM 5-105 seated  -GJ    Rt Knee Extension/Flexion PROM 5-120 (extension assessed in supine, flexion in seated)  -GJ       MMT Right Lower Ext    Rt Hip Flexion MMT, Gross Movement (4-/5) good minus  -GJ    Rt Hip ABduction MMT, Gross Movement (4/5) good  -GJ    Rt Knee Extension MMT, Gross Movement (4+/5) good plus  -GJ    Rt Knee Flexion MMT, Gross Movement (4+/5) good plus  -GJ    Rt Ankle Dorsiflexion MMT, Gross Movement (4+/5) good plus  -GJ              User Key  (r) = Recorded By, (t) = Taken By, (c) = Cosigned By      Initials Name Provider Type    Maxime Brooks, PT Physical Therapist                                  PT Assessment/Plan       Row Name 07/14/25 1103          PT Assessment    Functional Limitations Impaired gait;Limitation in home management;Performance in leisure activities;Limitations in functional capacity and performance;Limitations in community activities  -GJ     Impairments Balance;Gait;Endurance;Impaired flexibility;Impaired muscle endurance;Impaired muscle length;Impaired muscle power;Impaired postural alignment;Joint mobility;Muscle strength;Pain;Poor body mechanics;Posture;Range of motion  -GJ     Assessment Comments Ms. Chan is a 43 y/o female.  She has attended 4 sessions of physical therapy from  for her R knee pain.  Treatment has included therapeutic exercise, manual therapy, therapeutic activity, and patient education with home exercise program . Progress towards goals is fait having met 2 of 3 STG's and 0 of 4 LTG's.   See Ortho section for updated range of motion/strength testing.  She presents to the clinic ambulating without AD with noted lateral trunk flexion during right lower extremity weight acceptance through mid stance.  She continues to demonstrate decreased active range of motion/passive range of motion of the right knee.  She demonstrates compensatory strategies for ascending and descending stairs.  Encouraged continued adherence to HEP.  Reviewed anatomy and physiology of healing with focus on realistic expectations in terms of timeframes outcomes.  Encourage patient to continue to ice her knee 1-2 times per day.  Reviewed benefits of the utilization of a cane to decrease stress tissues to allow for greater ease of progression of exercises.  She continues to be a candidate for skilled physical therapy to address remaining functional goals  -GJ     Rehab Potential Good  -GJ     Patient/caregiver participated in establishment of treatment plan and goals Yes  -GJ     Patient would benefit from skilled therapy intervention Yes  -GJ        PT Plan    PT  Frequency 1x/week;2x/week  -GJ     Predicted Duration of Therapy Intervention (PT) 10 visits  -GJ     Planned CPT's? PT RE-EVAL: 03956;PT THER PROC EA 15 MIN: 45440;PT THER ACT EA 15 MIN: 53083;PT MANUAL THERAPY EA 15 MIN: 51049;PT NEUROMUSC RE-EDUCATION EA 15 MIN: 46991;PT GAIT TRAINING EA 15 MIN: 53226;PT HOT OR COLD PACK TREAT MCARE;PT ELECTRICAL STIM UNATTEND:   -GJ     PT Plan Comments print new erika, add lateral stepping, 3 D hip with BUE support, ? step up small step?  -GJ               User Key  (r) = Recorded By, (t) = Taken By, (c) = Cosigned By      Initials Name Provider Type    Maxime Brooks, PT Physical Therapist                       OP Exercises       Row Name 07/14/25 0923 07/14/25 0900          Subjective    Subjective Comments -- i have pain in my knee, but I keep on trying  -GJ        Subjective Pain    Pre-Treatment Pain Level -- 7  -GJ        Total Minutes    57477 - PT Therapeutic Exercise Minutes 13  -GJ --     56781 - PT Therapeutic Activity Minutes 25  -GJ --        Exercise 1    Exercise Name 1 -- nustep  -GJ     Time 1 -- 5 min  -GJ        Exercise 6    Exercise Name 6 -- HR, standing  -GJ     Cueing 6 -- Verbal;Demo  -GJ     Sets 6 -- 2  -GJ     Reps 6 -- 10  -GJ     Time 6 -- cues to not lean forward  -GJ        Exercise 7    Exercise Name 7 -- LAQ  -GJ     Cueing 7 -- Verbal;Demo  -GJ     Sets 7 -- 2  -GJ     Reps 7 -- 10  -GJ     Time 7 -- 2#  -GJ     Additional Comments -- control eccentric phase  -GJ        Exercise 8    Exercise Name 8 -- seated HS curls  -GJ     Cueing 8 -- Verbal;Demo  -GJ     Sets 8 -- 2  -GJ     Reps 8 -- 10  -GJ     Time 8 -- RTB  -GJ        Exercise 9    Exercise Name 9 -- Seated HS stretch  -GJ     Cueing 9 -- Verbal;Demo  -GJ     Reps 9 -- 3  -GJ     Time 9 -- 20s  -GJ               User Key  (r) = Recorded By, (t) = Taken By, (c) = Cosigned By      Initials Name Provider Type    Maxime Brooks, PT Physical Therapist                                   PT OP Goals       Row Name 07/14/25 0900          PT Short Term Goals    STG Date to Achieve 08/15/25  -GJ     STG 1 pt. to be I with initial HEP to facilitate self management of their condition  -GJ     STG 1 Progress Met  -GJ     STG 2 pt. to be educated in/verbalize understanding of the importance of posture/ergonomics in association with their condition to facilitate self management of their condition  -GJ     STG 2 Progress Met  -GJ     STG 3 pt. to ambulate with near normal heel to toe gait pattern with SC to facilitate ease/safety with community mobility  -GJ     STG 3 Progress Ongoing  -GJ     STG 3 Progress Comments ambulates without AD, noted increased  lateral sway over RLE during mid stance  -GJ        Long Term Goals    LTG Date to Achieve 09/11/25  -GJ     LTG 1 pt. to be I with advanced HEP to facilitate self management of their condition  -GJ     LTG 1 Progress Ongoing  -GJ     LTG 2 pt to demonstrate Right knee AROM >/= 2-115 (seated) degrees to facilitate ease/safety with gait  -GJ     LTG 2 Progress Ongoing  -GJ     LTG 2 Progress Comments 5-105  -GJ     LTG 3 pt. to ascend/descends stairs with reciprocal pattern (</= 1 rails) to facilitate ease/safety of household/community mobility  -GJ     LTG 3 Progress Ongoing  -GJ     LTG 3 Progress Comments ascends reciprocally with compensation, B rails, descend reciprocally and with step to pattern, with compensation  -GJ     LTG 4 pt. to ambulate without AD with near normal heel to toe gait pattern to facilitate ease/safety of community mobility  -GJ     LTG 4 Progress Ongoing  -GJ     LTG 4 Progress Comments ambulates without AD, noted increased lateral sway over RLE during mid stance  -GJ     LTG 5 To demonstrate /perform greater than or equal to 10 repetitions of sit to stand during the 30 s sit to stand test demonstrating improved hip girdle and core strength allowing for greater ease and safety with household and community  mobility  -GJ     LTG 5 Progress Ongoing  -GJ     LTG 5 Progress Comments 7  -GJ               User Key  (r) = Recorded By, (t) = Taken By, (c) = Cosigned By      Initials Name Provider Type    Maxime Brooks, PT Physical Therapist                    Therapy Education  Education Details: [id number], reviewed activity modifications, reviewed benefits of AD in terms of decreasing stress to tissues and encouraged to use cane. Reviewed anatomy of the knee and physiology of healing. encouraged pt to ice her knee 10-15 min 1-2 x's per day  Given: HEP, Symptoms/condition management, Pain management, Posture/body mechanics, Fall prevention and home safety, Mobility training  Program: Reinforced, Progressed  How Provided: Verbal, Demonstration, Written  Provided to: Patient  Level of Understanding: Teach back education performed, Verbalized, Demonstrated    Outcome Measure Options: Knee Outcome Score- ADL, 30 Second Chair Stand Test  30 Second Chair Stand Test  30 Second Chair Stand Test: 7, UE's         Time Calculation:   Start Time: 0922  Stop Time: 1000  Time Calculation (min): 38 min  Timed Charges  92429 - PT Therapeutic Exercise Minutes: 13  00325 - PT Therapeutic Activity Minutes: 25  Total Minutes  Timed Charges Total Minutes: 38   Total Minutes: 38  Therapy Charges for Today       Code Description Service Date Service Provider Modifiers Qty    33567687251 HC PT THER PROC EA 15 MIN 7/14/2025 Maxime Koo, PT GP 1    51579772552 HC PT THERAPEUTIC ACT EA 15 MIN 7/14/2025 Maxime Koo, PT GP 2            PT G-Codes  Outcome Measure Options: Knee Outcome Score- ADL, 30 Second Chair Stand Test         Maxime Koo, ANTONIO  7/14/2025

## 2025-07-21 ENCOUNTER — HOSPITAL ENCOUNTER (OUTPATIENT)
Dept: PHYSICAL THERAPY | Facility: HOSPITAL | Age: 45
Setting detail: THERAPIES SERIES
Discharge: HOME | End: 2025-07-21
Payer: MEDICAID

## 2025-07-21 DIAGNOSIS — M25.561 RIGHT KNEE PAIN, UNSPECIFIED CHRONICITY: Primary | ICD-10-CM

## 2025-07-21 DIAGNOSIS — R26.89 IMPAIRED GAIT AND MOBILITY: ICD-10-CM

## 2025-07-21 DIAGNOSIS — Z74.09 IMPAIRED MOBILITY: ICD-10-CM

## 2025-07-21 PROCEDURE — 97116 GAIT TRAINING THERAPY: CPT

## 2025-07-21 PROCEDURE — 97110 THERAPEUTIC EXERCISES: CPT

## 2025-07-21 NOTE — THERAPY TREATMENT NOTE
Outpatient Physical Therapy Ortho Treatment Note   [place]     Patient Name: Sarah Chan  : 1980  MRN: [id number]  Today's Date: 2025      Visit Date: 2025    Visit Dx:    ICD-10-CM ICD-9-CM   1. Right knee pain, unspecified chronicity  M25.561 719.46   2. Impaired mobility  Z74.09 799.89   3. Impaired gait and mobility  R26.89 781.2       Patient Active Problem List   Diagnosis    Screening mammogram for breast cancer    Irregular menses    Anemia    Abnormal uterine bleeding (AUB)    Abnormal uterine bleeding        Past Medical History:   Diagnosis Date    Bipolar 1 disorder     Diabetes     TYPE 2    Encounter for blood transfusion     NO REACTION    History of Bell's palsy     Hypertension     Irregular menses         Past Surgical History:   Procedure Laterality Date    D & C HYSTEROSCOPY N/A 3/24/2025    Procedure: DILATATION AND CURETTAGE HYSTEROSCOPY and placement of intrauteerine device;  Surgeon: Inez Mcclain MD;  Location: [facility];  Service: Obstetrics/Gynecology;  Laterality: N/A;    TONSILLECTOMY                          PT Assessment/Plan       Row Name 25 1100          PT Assessment    Assessment Comments Ms. Chan returns to PT reporting no changes in knee pain, still painful and causing pt some emotional distress. Pt is now considering the knee injection; advised pt to schedule with ortho MD to have this procedure completed. She also requests to be shown how to use SPC, which PT has encouraged her to use for decreasing stress to L knee joint during ambulation. Spent time on gait training, which pt demonstrated excellent carryover of use of SPC with gait and verbalized noticable benefits to her knee. Continued ther ex completed last visit to focus on BLE strengthening. Pt remains appropriate for skilled PT at this time.  -        PT Plan    PT Plan Comments has pt been using SPC with amb?  continue gait trianing if needed. did she make  f/u appt with ortho? add lateral stepping, 3 D hip with BUE support, ? step up small step?  -DR               User Key  (r) = Recorded By, (t) = Taken By, (c) = Cosigned By      Initials Name Provider Type    Gwyn Cain, PT Physical Therapist                       OP Exercises       Row Name 07/21/25 1000             Subjective    Subjective Comments Im just not doing great overall.  -DR         Total Minutes    61390 - Gait Training Minutes  8  -DR      42481 - PT Therapeutic Exercise Minutes 30  -DR         Exercise 1    Exercise Name 1 nustep  -DR      Time 1 5 min  -DR         Exercise 6    Exercise Name 6 HR, standing  -DR      Cueing 6 Verbal;Demo  -DR      Sets 6 2  -DR      Reps 6 10  -DR      Time 6 cues to not lean forward  -DR         Exercise 7    Exercise Name 7 LAQ  -DR      Cueing 7 Verbal;Demo  -DR      Sets 7 2  -DR      Reps 7 10  -DR      Time 7 2#  -DR      Additional Comments control eccentric phase  -DR         Exercise 8    Exercise Name 8 seated HS curls  -DR      Cueing 8 Verbal;Demo  -DR      Sets 8 2  -DR      Reps 8 10  -DR      Time 8 RTB  -DR         Exercise 9    Exercise Name 9 Seated HS stretch  -DR      Cueing 9 Verbal;Demo  -DR      Reps 9 3  -DR      Time 9 20s  -DR         Exercise 10    Exercise Name 10 1 lap gait training around clinic using SPC in L hand to assist with offloading FWB onto RLE during stance phase of gait  -DR      Time 10 8 min  -DR                User Key  (r) = Recorded By, (t) = Taken By, (c) = Cosigned By      Initials Name Provider Type    Gwyn Cain, PT Physical Therapist                                  PT OP Goals       Row Name 07/21/25 1100          PT Short Term Goals    STG Date to Achieve 08/15/25  -DR     STG 1 pt. to be I with initial HEP to facilitate self management of their condition  -DR     STG 1 Progress Met  -DR     STG 2 pt. to be educated in/verbalize understanding of the importance of posture/ergonomics in association  with their condition to facilitate self management of their condition  -DR     STG 2 Progress Met  -DR     STG 3 pt. to ambulate with near normal heel to toe gait pattern with SC to facilitate ease/safety with community mobility  -DR     STG 3 Progress Ongoing  -        Long Term Goals    LTG Date to Achieve 09/11/25  -DR     LTG 1 pt. to be I with advanced HEP to facilitate self management of their condition  -DR     LTG 1 Progress Ongoing  -DR     LTG 2 pt to demonstrate Right knee AROM >/= 2-115 (seated) degrees to facilitate ease/safety with gait  -DR     LTG 2 Progress Ongoing  -DR     LTG 3 pt. to ascend/descends stairs with reciprocal pattern (</= 1 rails) to facilitate ease/safety of household/community mobility  -DR     LTG 3 Progress Ongoing  -DR     LTG 4 pt. to ambulate without AD with near normal heel to toe gait pattern to facilitate ease/safety of community mobility  -DR     LTG 4 Progress Ongoing  -DR     LTG 5 To demonstrate /perform greater than or equal to 10 repetitions of sit to stand during the 30 s sit to stand test demonstrating improved hip girdle and core strength allowing for greater ease and safety with household and community mobility  -DR     LTG 5 Progress Ongoing  -               User Key  (r) = Recorded By, (t) = Taken By, (c) = Cosigned By      Initials Name Provider Type    Gwyn Cain, PT Physical Therapist                    Therapy Education  Education Details: use of SPC with gait training\  Given: Mobility training, Pain management, Symptoms/condition management  Program: New, Reinforced  How Provided: Verbal, Demonstration  Provided to: Patient  Level of Understanding: Teach back education performed, Verbalized, Demonstrated              Time Calculation:   Start Time: 1037  Stop Time: 1115  Time Calculation (min): 38 min  Timed Charges  42490 - PT Therapeutic Exercise Minutes: 30  15459 - Gait Training Minutes : 8  Total Minutes  Timed Charges Total Minutes: 38    Total Minutes: 38  Therapy Charges for Today       Code Description Service Date Service Provider Modifiers Qty    94062191173  PT THER PROC EA 15 MIN 7/21/2025 Gwyn Child, PT GP 2    29506298371 HC GAIT TRAINING EA 15 MIN 7/21/2025 Gwyn Child, PT GP 1                      Gwyn Child, PT  7/21/2025

## 2025-07-21 NOTE — PROGRESS NOTES
"Chief Complaint  Depression and anxiety    Subjective          Sarah Chan presents to [facility] with friend, Maryann, for an initial evaluation. The patient was last seen in [place] by Adelita Caldwell in June of 2025.    History of Present Illness: Sarah states, \"I am having problems with depression.\"  Sarah tells me that she has been depressed for a \"very long time.\"  She is unable to tell me how long she has been depressed, but reports that it is as long as she can remember, but seems to be worsening.  She has lived with her friend Maryann over the last 5 years and Maryann has become very concerned for her depression.  She also reports being concerned for her lack of sleep and mood fluctuations.  The patient admits that she goes from being okay to mad, frustrated, and overwhelmed in seconds.  Triggers very and Maryann's been walking on eggshells to prevent the patient from becoming upset.  She was upset upon arrival today when asked to complete the PHQ-9.  She has a difficult time with comprehension while reading and focusing, so she prefers that questionnaires to be read to her.  The patient is currently rating her depression a 10 out of 10 with 10 being the most severe.  She reports a daily depressed mood, anhedonia, impaired sleep, impaired appetite, decreased concentration, feelings of guilt as if she should be better, psychomotor retardation, and fatigue.  She adamantly denies any intent or plan for suicide.  She has felt suicidal in the past and was thinking about walking into traffic.  She denies having these thoughts or feelings now and identifies her friends and family as protective against suicide.  She notes that bipolar disorder and mental illness runs in her family with a brother being affected and her sister having bipolar disorder.  She was seeing a psychiatrist in [facility] when she moved to [place] 5 years ago.  She began seeing Dr. Matute " here and reports trying a medication that helped with mood, anxiety, and sleep, but is unable to recall which one.  Most recently, she has been seeing Adelita in [place] to started Latuda.  It appears that the medication has not been beneficial whatsoever on the patient's depression as she continues to endorse severe symptoms of depression.  She is also taking sertraline or Zoloft.  She reports severe symptoms of anxiety with daily worry, worrying about many different things, difficulty controlling her worry, being restless or irritable, being on edge, and waiting for something bad to happen.  She reports difficulty with falling and staying asleep.  She gets very little sleep at night because her brain keeps going and going, so she cannot fall asleep.  Maryann reports that she hears her up at night in the kitchen and walking through the house.  Maryann is fearful that the patient may elope one night and tried to hurt herself.  She reports having no appetite at all.  She has gone from 600 pounds down to her current weight of 285.  She would like to be around 220.  This is where she feels best.  It is upsetting that she has been gaining weight recently, according to the patient.  She is taking Ozempic to manage diabetes, as well as metformin.  She reports gaining weight on the GLP-1 agonist.  She is taking Zoloft and Latuda. She reports compliance and denies side effects of psychiatric medication. She denies SI/HI/AVH.    Past Psychiatric History: Sarah was seeing a psychiatrist in [place], [place] while living there.  About 5 years ago, she moved to [place] and began with psychiatry here.  She mentions seeing a Dr. Stringer prior to seeing Adelita Caldwell in [place], [place].  She is currently in therapy with Amanda Rodriguez in [place] and finds their work to be very helpful for her.  She is learning coping skills for anxiety and how to manage her symptoms of depression.  She does not disclose whether she has  "had any psychiatric inpatient admissions in the past.  She admits to having a suicidal ideation with intent to walk out into traffic years ago.  She adamantly denies any plan or intent for suicide at this time.  She identifies her friends and family as protective against suicide.    Substance Use/Abuse: Sarah adamantly denies the current or former use of any substances such as nicotine, alcohol, or recreational drugs.    Family Psychiatric History: Sarah reports a biological brother as having \"mental illness\" and a biological sister as having bipolar disorder.    Developmental History: Sarah reports growing up in [place], [place].  She was  when she moved to [place] about 5 years ago.  She has since split from her , but they are not .  She has 1/10 grade education, according to past psychiatric notes.    Social History: Sarah currently lives in [place], [place] with her friend, Maryann.  She is , but  from her spouse.  She has no biological children.  She has 1/10 grade education.  She is not working.  She has no income at this time.  She previously enjoyed listening to music and going out to eat.  She denies use of nicotine, alcohol, or recreational drugs.    Objective   Vital Signs:   /75   Pulse 79   Ht 157 cm (61.81\")   Wt 129 kg (285 lb)   SpO2 96%   BMI 52.45 kg/m²       PHQ-9 Score:   PHQ-9 Total Score:       VÍCTOR-7 Score:       MENTAL STATUS EXAM   General Appearance:  Well developed and unkempt  Eye Contact:  Poor eye contact  Attitude:  Guarded and evasive  Motor Activity:  Fidgety  Muscle Strength:  Normal  Speech:  Monotone and minimal spontaneity  Language:  Spontaneous  Mood and affect:  Depressed, anxious and flat (tearful at times)  Hopelessness:  10  Loneliness: Denies  Thought Process:  Logical, goal-directed and circumstantial  Associations/ Thought Content:  Paranoid delusions (believes people are always judging " her)  Hallucinations:  None  Suicidal Ideations:  Not present  Homicidal Ideation:  Not present  Sensorium:  Alert and clear  Orientation:  Person, place, time and situation  Immediate Recall, Recent, and Remote Memory:  Intact  Attention Span/ Concentration:  Good  Fund of Knowledge:  Appropriate for age and educational level  Intellectual Functioning:  Average range  Insight:  Fair  Judgement:  Fair  Reliability:  Fair  Impulse Control:  Good     Medical History:Diabetes, hypertension, obesity, anemia, and right knee pain    Current Medications:   Current Outpatient Medications   Medication Sig Dispense Refill    Lurasidone HCl (Latuda) 20 MG tablet tablet Take 1 tablet by mouth Daily for 60 days. 30 tablet 0    cyanocobalamin (VITAMIN B-12) 500 MCG tablet Take 2 tablets by mouth Daily.      famotidine (PEPCID) 20 MG tablet Take 1 tablet by mouth As Needed.      ferrous sulfate 325 (65 FE) MG tablet Take 1 tablet by mouth Daily With Breakfast. 30 tablet 0    folic acid (FOLVITE) 1 MG tablet Take 1 tablet by mouth Daily.      hydrocortisone 1 % cream Apply 1 Application topically to the appropriate area as directed 2 (Two) Times a Day. 28 g 0    Levonorgestrel (Mirena, 52 MG,) 20 MCG/DAY intrauterine device IUD To be inserted one time by prescriber. Route intrauterine. 1 each 0    lisinopril-hydrochlorothiazide (PRINZIDE,ZESTORETIC) 20-25 MG per tablet Take 1 tablet by mouth Daily. DO NOT TAKE FOR 24 HOURS BEFORE SURGERY      lovastatin (MEVACOR) 10 MG tablet Take 1 tablet by mouth Daily.      meloxicam (MOBIC) 7.5 MG tablet Take 1 tablet by mouth Daily.      metFORMIN (GLUCOPHAGE) 1000 MG tablet Take 1 tablet by mouth Daily With Breakfast.      metFORMIN (GLUCOPHAGE) 1000 MG tablet Take 1 tablet by mouth 2 (Two) Times a Day With Meals.      Norethin-Eth Estrad-Fe Biphas (Lo Loestrin Fe) 1 MG-10 MCG / 10 MCG tablet Take 1 tablet by mouth Daily. (Patient not taking: Reported on 4/9/2025) 28 tablet 3    nystatin  (MYCOSTATIN) 011597 UNIT/GM cream 1 Application As Needed. (Patient not taking: Reported on 4/9/2025)      OLANZapine (ZyPREXA) 5 MG tablet Take 1 tablet by mouth Every Night. 30 tablet 0    OLANZapine-Samidorphan (Lybalvi) 10-10 MG tablet Take 1 tablet by mouth Every Night. 30 tablet 1    ondansetron ODT (ZOFRAN-ODT) 4 MG disintegrating tablet Take 1 tablet by mouth. (Patient not taking: Reported on 6/23/2025)      Ozempic, 0.25 or 0.5 MG/DOSE, 2 MG/3ML solution pen-injector Inject 0.5 mg under the skin into the appropriate area as directed Every 7 (Seven) Days.      sertraline (ZOLOFT) 50 MG tablet Take 1 tablet by mouth 2 (Two) Times a Day. Takes one in am and one in pm      traMADol (ULTRAM) 50 MG tablet Take 1 tablet by mouth Every 6 (Six) Hours As Needed for Moderate Pain or Severe Pain. 5 tablet 0    triamcinolone (KENALOG) 0.1 % cream Apply 1 Application topically to the appropriate area as directed As Needed. (Patient not taking: Reported on 4/9/2025)       No current facility-administered medications for this visit.   Physical Exam  Vitals and nursing note reviewed.   Constitutional:       Appearance: Normal appearance. She is well-developed. She is obese.   Musculoskeletal:         General: Tenderness (right knee) present.      Comments: Ambulates with the assistance of a straight cane due to right knee pain   Skin:     General: Skin is warm and dry.   Neurological:      General: No focal deficit present.      Mental Status: She is alert and oriented to person, place, and time.        Result Review :     The following data was reviewed by: SARAVANAN Cunha on 07/22/2025:    VITAMIN B12 (07/08/2025 14:35)  IRON PROFILE (07/08/2025 14:35)  FERRITIN (07/08/2025 14:35)  COMPREHENSIVE METABOLIC PANEL (07/08/2025 14:35)  CBC AND DIFFERENTIAL (07/08/2025 14:35)  Office Visit with Adelita Caldwell APRN (06/23/2025)    Office Visit with Amanda Rodriguez LCSW (06/27/2025)   Office Visit with Michael  "DARIUS Patel (07/01/2025)     Assessment and Plan    Diagnoses and all orders for this visit:    1. Severe episode of recurrent major depressive disorder, with psychotic features (Primary)  -     OLANZapine (ZyPREXA) 5 MG tablet; Take 1 tablet by mouth Every Night.  Dispense: 30 tablet; Refill: 0  -     Lurasidone HCl (Latuda) 20 MG tablet tablet; Take 1 tablet by mouth Daily for 60 days.  Dispense: 30 tablet; Refill: 0    2. Generalized anxiety disorder with panic attacks    3. Bipolar disorder with severe depression  -     OLANZapine-Samidorphan (Lybalvi) 10-10 MG tablet; Take 1 tablet by mouth Every Night.  Dispense: 30 tablet; Refill: 1         Impression:  -This is an initial evaluation of the patient. Sarah is a , 45-year-old  female who presents with a longtime friend, Maryann, for an initial evaluation by me. She was last seen by Adelita in [place] in June. Her Latuda was increased for ongoing depression with psychosis and anxiety.  Sarah reports being depressed and anxious for a very long time.  She is unsure if this started after losing her father to a stroke and has a difficult time with answering questions.  She gives many \"I do not know\" answers.  She is guarded in the beginning of the visit, but begins to loosen up and relax as we build report.  She reports feeling as if everyone is going to  her and has a difficult time talking about her symptoms.  She adamantly denies any intent or plan for suicide or homicidal ideations.  She began experiencing a worsening of this depressive episode in February 2024 and has been ongoing.  Her friend, Maryann, who is present with her today expresses her concerns for the patient's depression.  She is fearful that the patient will elope in the middle of the night and hurt herself.  The patient does not sleep but for a few hours.  She has difficulty both falling and sustaining sleep.  She is unable to recall past psychiatric medications " "tried, except Zoloft.  She does believe that she has tried Rexulti without benefits and most recently Latuda without benefits.  I reviewed her symptoms and concerns and suggested that we change Latuda to Lybalvi.  I explained that we will first have to try olanzapine which does have risk for weight gain and metabolic effects; however, we would try to change to lLybalvi as soon as possible.  I explained the mechanism of action, purpose, risk, benefits, and potential adverse effects of both medications.  The patient agrees to tapering from Latuda and trying olanzapine with a plan to change to Lybalvi.  She is going to continue therapy with Amanda which she finds to be very helpful.  Today, she plans on going outside and trying grounding techniques.  She also finds the \"box\" technique helpful when she is feeling down.  She finds this to be a safe place to go and be free from her thoughts and in the presence of God, angels, [facility] [place], and [place].  There has been questions whether she is having auditory or visual hallucinations, but this may be tied into Voodoo or cultural differences.  She also has symptoms similar to attaque de nervosa and has been using rubbing alcohol under the nose when she is feeling overwhelmed and panic.  She reports wanting to do this a lot every day.  Her primary care advised that she switched to Vicks.  She has found both to be helpful.  Maryann also notes that she has been chewing on toilet paper, but the patient adamantly denies that she is swallowing the paper.  This may be tied into anemia or associated with pica.  She has been treated with blood transfusions and iron infusions for anemia.  Currently, she is only taking oral medications.  -Taper to stop Latuda.  Will take 20 mg nightly with food for a week then 20 mg every other night with food for a week then stop.  -Initiate olanzapine 5 mg nightly for bipolar disorder x 2 weeks, then changed to Lybalvi 10-10 mg nightly for bipolar " disorder.  -Continue Zoloft 100 mg nightly for depression and anxiety.  -Continue therapy.  - We will complete an AIMS assessment at next visit.    Prognosis: Fair with ongoing treatment.  Prognosis is largely dependnet on patient’s adherence to medicaion management, follow up appointments and willingness to particapte in psychotherapy.     TREATMENT PLAN/GOALS: Continue supportive psychotherapy efforts and medications as indicated. Treatment and medication options discussed during today's visit. Patient ackowledged and verbally consented to continue with current treatment plan and was educated on the importance of compliance with treatment and follow-up appointments.    MEDICATION ISSUES:    We discussed risks, benefits, and side effects of the above medications and the patient was agreeable with the plan. Patient was educated on the importance of compliance with treatment and follow-up appointments.  Patient is agreeable to call the office with any worsening of symptoms or onset of side effects. Patient is agreeable to call 911 or go to the nearest ER should he/she begin having SI/HI.      Counseled patient regarding multimodal approach with healthy nutrition, healthy sleep, regular physical activity, social activities, counseling, and medications.      Coping skills reviewed and encouraged positive framing of thoughts     Assisted patient in processing above session content; acknowledged and normalized patient's thoughts, feelings, and concerns.  Applied  positive coping skills and behavior management in session.  Allowed patient to freely discuss issues without interruption or judgment. Provided safe, confidential environment to facilitate the development of positive therapeutic relationship and encourage open, honest communication. Assisted patient in identifying risk factors which would indicate the need for higher level of care including thoughts to harm self or others and/or self-harming behavior and  "encouraged patient to contact this office, call 911, or present to the nearest emergency room should any of these events occur. Encouraged to contact [facility] at [phone] for additional behavioral health resources. Lines are available 7am-7pm seven days a week. Patient encouraged to call \"[phone]\" for suicidal or homicidal ideations.    May also contact the triage line at [facility] in [place], [place] 24 hours per day, seven days per week. [phone]    MEDS ORDERED DURING VISIT:  New Medications Ordered This Visit   Medications    OLANZapine (ZyPREXA) 5 MG tablet     Sig: Take 1 tablet by mouth Every Night.     Dispense:  30 tablet     Refill:  0    Lurasidone HCl (Latuda) 20 MG tablet tablet     Sig: Take 1 tablet by mouth Daily for 60 days.     Dispense:  30 tablet     Refill:  0    OLANZapine-Samidorphan (Lybalvi) 10-10 MG tablet     Sig: Take 1 tablet by mouth Every Night.     Dispense:  30 tablet     Refill:  1           Follow Up   Return in about 4 weeks (around 8/19/2025) for Medication Check.    Patient was given instructions and counseling regarding her condition or for health maintenance advice. Please see specific information pulled into the AVS if appropriate.     This document has been electronically signed by SARAVANAN Cunha  July 22, 2025 12:03 EDT      This document has been electronically signed by SARAVANAN Butler, PMHNP-BC  July 22, 2025 12:03 EDT    Part of this note may be an electronic transcription/translation of spoken language to printed text using the Dragon Dictation System.  "

## 2025-07-22 ENCOUNTER — OFFICE VISIT (OUTPATIENT)
Dept: BEHAVIORAL HEALTH | Facility: CLINIC | Age: 45
End: 2025-07-22
Payer: MEDICAID

## 2025-07-22 ENCOUNTER — OFFICE VISIT (OUTPATIENT)
Age: 45
End: 2025-07-22
Payer: MEDICAID

## 2025-07-22 VITALS
OXYGEN SATURATION: 96 % | HEART RATE: 79 BPM | DIASTOLIC BLOOD PRESSURE: 75 MMHG | SYSTOLIC BLOOD PRESSURE: 107 MMHG | HEIGHT: 62 IN | BODY MASS INDEX: 52.44 KG/M2 | WEIGHT: 285 LBS

## 2025-07-22 DIAGNOSIS — F31.4 BIPOLAR DISORDER WITH SEVERE DEPRESSION: ICD-10-CM

## 2025-07-22 DIAGNOSIS — F41.0 GENERALIZED ANXIETY DISORDER WITH PANIC ATTACKS: ICD-10-CM

## 2025-07-22 DIAGNOSIS — F41.1 GENERALIZED ANXIETY DISORDER WITH PANIC ATTACKS: ICD-10-CM

## 2025-07-22 DIAGNOSIS — F33.3 SEVERE EPISODE OF RECURRENT MAJOR DEPRESSIVE DISORDER, WITH PSYCHOTIC FEATURES: Primary | Chronic | ICD-10-CM

## 2025-07-22 DIAGNOSIS — F33.3 SEVERE EPISODE OF RECURRENT MAJOR DEPRESSIVE DISORDER, WITH PSYCHOTIC FEATURES: Primary | ICD-10-CM

## 2025-07-22 DIAGNOSIS — F41.1 GENERALIZED ANXIETY DISORDER WITH PANIC ATTACKS: Chronic | ICD-10-CM

## 2025-07-22 DIAGNOSIS — F41.0 GENERALIZED ANXIETY DISORDER WITH PANIC ATTACKS: Chronic | ICD-10-CM

## 2025-07-22 RX ORDER — LURASIDONE HYDROCHLORIDE 20 MG/1
20 TABLET, FILM COATED ORAL DAILY
Qty: 30 TABLET | Refills: 0 | Status: SHIPPED | OUTPATIENT
Start: 2025-07-22 | End: 2025-09-20

## 2025-07-22 RX ORDER — OLANZAPINE AND SAMIDORPHAN L-MALATE 10; 10 MG/1; MG/1
1 TABLET, FILM COATED ORAL NIGHTLY
Qty: 30 TABLET | Refills: 1 | Status: SHIPPED | OUTPATIENT
Start: 2025-07-22

## 2025-07-22 RX ORDER — OLANZAPINE 5 MG/1
5 TABLET, FILM COATED ORAL NIGHTLY
Qty: 30 TABLET | Refills: 0 | Status: SHIPPED | OUTPATIENT
Start: 2025-07-22 | End: 2026-07-22

## 2025-07-22 NOTE — PATIENT INSTRUCTIONS
Homework assignment: Practice mindfulness and vagus nerve exercises at least daily.  Go outside and sit in have a conversation with your roommate for 30 minutes each day at a minimum.  Participating grounding activities using all 5 senses while outside.

## 2025-07-22 NOTE — PROGRESS NOTES
"Date: July 22, 2025  Time In: 8 AM  Time Out: 8:50 AM      PROGRESS NOTE  Data:  Sarah Chan is a 45 y.o. female who presents today for individual therapy session at [facility] with Cynthia Rodriguez LCSW.     Patient Chief Complaint: \"Everything is the same.\"    Clinical Maneuvering/Intervention:        The patient presents for evaluation of depression.    She reports no change in her mood, which remains the same. She has been on medication for several weeks, which she believes has been beneficial. However, she does not feel an increase in energy or improvement in her thoughts. She is making efforts to improve her condition, including practicing mindfulness exercises, but feels that these attempts are not yielding results. She spends most of her day in bed, alone in her room, and has internet access. She describes her current state as feeling worthless and stuck, and experiences daily anger. She acknowledges that she is not the person she used to be and expresses a desire to return to her former self. She feels that both physical and mental barriers are preventing her from getting out of bed. She recalls that she used to get up, dress, have breakfast, and drive to work after a good night's sleep. She enjoyed listening to music, socializing with friends, and spending time outside. Currently, she finds everything bothersome and annoying. She experiences a range of emotions, including anger, frustration, sadness, and happiness, often simultaneously. She enjoys sweet tea and occasionally likes being in nature. She reports no suicidal ideation.    Supplemental Information  She has arthritis and goes to therapy. Her therapist told her that her leg looks different and advised her to use a cane for 12 weeks. Her leg sometimes feels okay, but it gets swollen occasionally.       (Scales based on 0 - 10 with 10 being the worst)  Depression: 9 Anxiety: 9       Assisted patient in processing above " session content; acknowledged and normalized patient’s thoughts, feelings, and concerns. Rationalized patient thought process regarding lack of improvement of symptoms. Discussed triggers associated with patient's anxiety and depressive symptoms. Also discussed coping skills for patient to implement such as use of wise mind, breathing exercises as well as vagus nerve exercises.    Allowed patient to freely discuss issues without interruption or judgment. Provided safe, confidential environment to facilitate the development of positive therapeutic relationship and encourage open, honest communication. Assisted patient in identifying risk factors which would indicate the need for higher level of care including thoughts to harm self or others and/or self-harming behavior and encouraged patient to contact this office, call 911, or present to the nearest emergency room should any of these events occur. Discussed crisis intervention services and means to access. Patient adamantly and convincingly denies current suicidal or homicidal ideation or perceptual disturbance.    Assessment   Patient appears to maintain relative stability as compared to their baseline. However, patient continues to struggle with anxiety and depressive symptoms which continues to cause impairment in important areas of functioning. A result, they can be reasonably expected to continue to benefit from treatment and would likely be at increased risk for decompensation otherwise.    Mental Status Exam:   Hygiene:   fair  Cooperation:  Cooperative  Eye Contact:  Poor  Psychomotor Behavior:  Slow  Affect:  Restricted and Blunted  Mood: sad, depressed, and anxious  Speech:  Minimal  Thought Process:  Linear  Thought Content:  Normal  Suicidal:  None  Homicidal:  None  Hallucinations:  None  Delusion:  None  Memory:  Intact  Orientation:  Person, Place, Time, and Situation  Reliability:  fair  Insight:  Fair  Judgement:  Impaired  Impulse Control:   Fair  Physical/Medical Issues:  No        Patient's Support Network Includes:  extended family and friends    Functional Status: Mild impairment     Progress toward goal: Not at goal    Prognosis: Good with Ongoing Treatment        1. Depression.  Her symptoms and history are consistent with depression. She reports feeling the same mood-wise and has been on medication for several weeks, which has helped slightly with her thoughts but not her energy levels. She does not have thoughts of self-harm. A comprehensive discussion was held regarding the potential benefits of mindfulness exercises in managing her depression. She was encouraged to engage in deep breathing, vagus nerve and grounding exercises daily for 30 minutes, focusing on her surroundings and using all five senses. Additionally, she was advised to spend time outdoors, preferably in the morning, to absorb sunlight and engage in conversation with her roommate. The importance of challenging negative thoughts and rewarding herself for small achievements was emphasized. She was also encouraged to continue attending weekly appointments.            Plan     Resources: Patient was provided with the following community resources: None at this time    Patient will continue in individual outpatient therapy with focus on improved functioning and coping skills, maintaining stability, and avoiding decompensation and the need for higher level of care.    Patient will adhere to any medication regimens as prescribed and report any side effects. Patient will contact this office, call 911 or present to the nearest emergency room should suicidal or homicidal ideations occur. Provide cognitive behavioral therapy and solution focused therapy to improve functioning, maintain stability and avoid decompensation and the need for higher level of care.     Return at first available appointment or earlier if symptoms worsen or fail to improve.           VISIT DIAGNOSIS:     ICD-10-CM  ICD-9-CM   1. Severe episode of recurrent major depressive disorder, with psychotic features  F33.3 296.34   2. Generalized anxiety disorder with panic attacks  F41.1 300.02    F41.0 300.01        Patient was given instructions and counseling regarding her condition or for health maintenance advice. Please see specific information pulled into the AVS if appropriate.     Patient or patient representative verbalized consent for the use of Ambient Listening during the visit with  Amanda Rodriguez LCSW for chart documentation. 7/22/2025  09:05 EDT     This document has been electronically signed by Amanda Rodriguez LCSW   July 22, 2025 09:06 EDT      Part of this note may be an electronic transcription/translation of spoken language to printed text using the Dragon Dictation System.

## 2025-07-23 ENCOUNTER — TELEPHONE (OUTPATIENT)
Dept: OBSTETRICS AND GYNECOLOGY | Age: 45
End: 2025-07-23

## 2025-07-28 ENCOUNTER — APPOINTMENT (OUTPATIENT)
Dept: PHYSICAL THERAPY | Facility: HOSPITAL | Age: 45
End: 2025-07-28
Payer: MEDICAID

## 2025-08-04 ENCOUNTER — HOSPITAL ENCOUNTER (OUTPATIENT)
Dept: PHYSICAL THERAPY | Facility: HOSPITAL | Age: 45
Setting detail: THERAPIES SERIES
Discharge: HOME | End: 2025-08-04
Payer: MEDICAID

## 2025-08-04 DIAGNOSIS — R26.89 IMPAIRED GAIT AND MOBILITY: ICD-10-CM

## 2025-08-04 DIAGNOSIS — Z74.09 IMPAIRED MOBILITY: ICD-10-CM

## 2025-08-04 DIAGNOSIS — M25.561 RIGHT KNEE PAIN, UNSPECIFIED CHRONICITY: Primary | ICD-10-CM

## 2025-08-04 PROCEDURE — 97110 THERAPEUTIC EXERCISES: CPT

## 2025-08-05 ENCOUNTER — OFFICE VISIT (OUTPATIENT)
Dept: BEHAVIORAL HEALTH | Facility: CLINIC | Age: 45
End: 2025-08-05
Payer: MEDICAID

## 2025-08-05 DIAGNOSIS — F33.3 SEVERE EPISODE OF RECURRENT MAJOR DEPRESSIVE DISORDER, WITH PSYCHOTIC FEATURES: Primary | ICD-10-CM

## 2025-08-05 DIAGNOSIS — F41.1 GENERALIZED ANXIETY DISORDER WITH PANIC ATTACKS: ICD-10-CM

## 2025-08-05 DIAGNOSIS — F41.0 GENERALIZED ANXIETY DISORDER WITH PANIC ATTACKS: ICD-10-CM

## 2025-08-05 PROCEDURE — 1159F MED LIST DOCD IN RCRD: CPT | Performed by: COUNSELOR

## 2025-08-05 PROCEDURE — 1160F RVW MEDS BY RX/DR IN RCRD: CPT | Performed by: COUNSELOR

## 2025-08-05 PROCEDURE — 90834 PSYTX W PT 45 MINUTES: CPT | Performed by: COUNSELOR

## 2025-08-08 ENCOUNTER — OFFICE VISIT (OUTPATIENT)
Dept: OBSTETRICS AND GYNECOLOGY | Age: 45
End: 2025-08-08
Payer: MEDICAID

## 2025-08-08 VITALS
WEIGHT: 285 LBS | SYSTOLIC BLOOD PRESSURE: 120 MMHG | DIASTOLIC BLOOD PRESSURE: 76 MMHG | HEIGHT: 62 IN | BODY MASS INDEX: 52.44 KG/M2

## 2025-08-08 DIAGNOSIS — N92.1 BREAKTHROUGH BLEEDING ASSOCIATED WITH INTRAUTERINE DEVICE (IUD): ICD-10-CM

## 2025-08-08 DIAGNOSIS — Z97.5 BREAKTHROUGH BLEEDING ASSOCIATED WITH INTRAUTERINE DEVICE (IUD): ICD-10-CM

## 2025-08-08 DIAGNOSIS — Z97.5 IUD (INTRAUTERINE DEVICE) IN PLACE: Primary | ICD-10-CM

## 2025-08-08 RX ORDER — DICLOFENAC SODIUM 75 MG/1
TABLET, DELAYED RELEASE ORAL
COMMUNITY
Start: 2025-05-12

## 2025-08-08 RX ORDER — ROSUVASTATIN CALCIUM 10 MG/1
TABLET, COATED ORAL
COMMUNITY
Start: 2025-04-22

## 2025-08-11 LAB
A VAGINAE DNA VAG QL NAA+PROBE: ABNORMAL SCORE
BVAB2 DNA VAG QL NAA+PROBE: ABNORMAL SCORE
C ALBICANS DNA VAG QL NAA+PROBE: NEGATIVE
C GLABRATA DNA VAG QL NAA+PROBE: POSITIVE
C TRACH DNA SPEC QL NAA+PROBE: NEGATIVE
MEGA1 DNA VAG QL NAA+PROBE: ABNORMAL SCORE
N GONORRHOEA DNA VAG QL NAA+PROBE: NEGATIVE
T VAGINALIS DNA VAG QL NAA+PROBE: NEGATIVE

## 2025-08-13 DIAGNOSIS — B37.9 CANDIDA GLABRATA INFECTION: Primary | ICD-10-CM

## 2025-08-13 RX ORDER — NYSTATIN 100000 U/G
1 OINTMENT TOPICAL 2 TIMES DAILY
Qty: 30 G | Refills: 1 | Status: SHIPPED | OUTPATIENT
Start: 2025-08-13

## 2025-08-27 ENCOUNTER — OFFICE VISIT (OUTPATIENT)
Dept: BEHAVIORAL HEALTH | Facility: CLINIC | Age: 45
End: 2025-08-27
Payer: MEDICAID

## 2025-08-27 DIAGNOSIS — F41.0 GENERALIZED ANXIETY DISORDER WITH PANIC ATTACKS: ICD-10-CM

## 2025-08-27 DIAGNOSIS — F41.1 GENERALIZED ANXIETY DISORDER WITH PANIC ATTACKS: ICD-10-CM

## 2025-08-27 DIAGNOSIS — F33.3 SEVERE EPISODE OF RECURRENT MAJOR DEPRESSIVE DISORDER, WITH PSYCHOTIC FEATURES: Primary | ICD-10-CM

## (undated) DEVICE — SEAL HYSTERSCOPE/OUTFLOW CHANNEL MYOSURE

## (undated) DEVICE — NEEDLE, QUINCKE, 20GX3.5": Brand: MEDLINE

## (undated) DEVICE — LOU D & C HYSTEROSCOPY: Brand: MEDLINE INDUSTRIES, INC.

## (undated) DEVICE — GLV SURG BIOGEL LTX PF 6

## (undated) DEVICE — 1000ML,PRESSURE INFUSER W/STOPCOCK: Brand: MEDLINE

## (undated) DEVICE — DRAPE,UNDERBUTTOCKS,PCH,STERILE: Brand: MEDLINE

## (undated) DEVICE — SOL NACL 0.9PCT 1000ML

## (undated) DEVICE — GLV SURG SENSICARE POLYISPRN W/ALOE PF LF 6.5 GRN STRL

## (undated) DEVICE — SOL IRR NACL 0.9PCT 3000ML

## (undated) DEVICE — SET,IRRIGATION,CYSTO/TUR,90": Brand: MEDLINE

## (undated) DEVICE — GOWN ,SIRUS,NONREINFORCED SMALL: Brand: MEDLINE